# Patient Record
Sex: FEMALE | URBAN - METROPOLITAN AREA
[De-identification: names, ages, dates, MRNs, and addresses within clinical notes are randomized per-mention and may not be internally consistent; named-entity substitution may affect disease eponyms.]

---

## 2015-12-05 PROCEDURE — G8991 OTHER PT/OT GOAL STATUS: HCPCS

## 2018-05-18 ENCOUNTER — APPOINTMENT (OUTPATIENT)
Age: 48
Setting detail: DERMATOLOGY
End: 2018-06-11

## 2018-05-18 VITALS — HEIGHT: 64 IN | WEIGHT: 127 LBS

## 2018-05-18 DIAGNOSIS — L30.9 DERMATITIS, UNSPECIFIED: ICD-10-CM

## 2018-05-18 PROCEDURE — OTHER MEDICATION COUNSELING: OTHER

## 2018-05-18 PROCEDURE — OTHER PRESCRIPTION: OTHER

## 2018-05-18 PROCEDURE — OTHER ORDER TESTS: OTHER

## 2018-05-18 PROCEDURE — OTHER MIPS QUALITY: OTHER

## 2018-05-18 PROCEDURE — 11100: CPT

## 2018-05-18 PROCEDURE — OTHER BIOPSY BY PUNCH METHOD: OTHER

## 2018-05-18 RX ORDER — PREDNISONE 10 MG/1
TABLET ORAL
Qty: 20 | Refills: 0 | Status: ERX | COMMUNITY
Start: 2018-05-18

## 2018-05-18 ASSESSMENT — LOCATION SIMPLE DESCRIPTION DERM: LOCATION SIMPLE: LEFT UPPER BACK

## 2018-05-18 ASSESSMENT — LOCATION ZONE DERM: LOCATION ZONE: TRUNK

## 2018-05-18 ASSESSMENT — LOCATION DETAILED DESCRIPTION DERM: LOCATION DETAILED: LEFT SUPERIOR UPPER BACK

## 2018-05-18 NOTE — PROCEDURE: MEDICATION COUNSELING
Bactrim Pregnancy And Lactation Text: This medication is Pregnancy Category D and is known to cause fetal risk.  It is also excreted in breast milk.
Azathioprine Pregnancy And Lactation Text: This medication is Pregnancy Category D and isn't considered safe during pregnancy. It is unknown if this medication is excreted in breast milk.
Erythromycin Pregnancy And Lactation Text: This medication is Pregnancy Category B and is considered safe during pregnancy. It is also excreted in breast milk.
Humira Counseling:  I discussed with the patient the risks of adalimumab including but not limited to myelosuppression, immunosuppression, autoimmune hepatitis, demyelinating diseases, lymphoma, and serious infections.  The patient understands that monitoring is required including a PPD at baseline and must alert us or the primary physician if symptoms of infection or other concerning signs are noted.
Ketoconazole Counseling:   Patient counseled regarding improving absorption with orange juice.  Adverse effects include but are not limited to breast enlargement, headache, diarrhea, nausea, upset stomach, liver function test abnormalities, taste disturbance, and stomach pain.  There is a rare possibility of liver failure that can occur when taking ketoconazole. The patient understands that monitoring of LFTs may be required, especially at baseline. The patient verbalized understanding of the proper use and possible adverse effects of ketoconazole.  All of the patient's questions and concerns were addressed.
Eucrisa Counseling: Patient may experience a mild burning sensation during topical application. Eucrisa is not approved in children less than 2 years of age.
Odomzo Counseling- I discussed with the patient the risks of Odomzo including but not limited to nausea, vomiting, diarrhea, constipation, weight loss, changes in the sense of taste, decreased appetite, muscle spasms, and hair loss.  The patient verbalized understanding of the proper use and possible adverse effects of Odomzo.  All of the patient's questions and concerns were addressed.
Otezla Counseling: The side effects of Otezla were discussed with the patient, including but not limited to worsening or new depression, weight loss, diarrhea, nausea, upper respiratory tract infection, and headache. Patient instructed to call the office should any adverse effect occur.  The patient verbalized understanding of the proper use and possible adverse effects of Otezla.  All the patient's questions and concerns were addressed.
Benzoyl Peroxide Counseling: Patient counseled that medicine may cause skin irritation and bleach clothing.  In the event of skin irritation, the patient was advised to reduce the amount of the drug applied or use it less frequently.   The patient verbalized understanding of the proper use and possible adverse effects of benzoyl peroxide.  All of the patient's questions and concerns were addressed.
Use Enhanced Medication Counseling?: No
Solaraze Pregnancy And Lactation Text: This medication is Pregnancy Category B and is considered safe. There is some data to suggest avoiding during the third trimester. It is unknown if this medication is excreted in breast milk.
Bexarotene Pregnancy And Lactation Text: This medication is Pregnancy Category X and should not be given to women who are pregnant or may become pregnant. This medication should not be used if you are breast feeding.
Hydroxyzine Pregnancy And Lactation Text: This medication is not safe during pregnancy and should not be taken. It is also excreted in breast milk and breast feeding isn't recommended.
Cyclosporine Counseling:  I discussed with the patient the risks of cyclosporine including but not limited to hypertension, gingival hyperplasia,myelosuppression, immunosuppression, liver damage, kidney damage, neurotoxicity, lymphoma, and serious infections. The patient understands that monitoring is required including baseline blood pressure, CBC, CMP, lipid panel and uric acid, and then 1-2 times monthly CMP and blood pressure.
Valtrex Pregnancy And Lactation Text: this medication is Pregnancy Category B and is considered safe during pregnancy. This medication is not directly found in breast milk but it's metabolite acyclovir is present.
Infliximab Counseling:  I discussed with the patient the risks of infliximab including but not limited to myelosuppression, immunosuppression, autoimmune hepatitis, demyelinating diseases, lymphoma, and serious infections.  The patient understands that monitoring is required including a PPD at baseline and must alert us or the primary physician if symptoms of infection or other concerning signs are noted.
Dupixent Counseling: I discussed with the patient the risks of dupilumab including but not limited to eye infection and irritation, cold sores, injection site reactions, worsening of asthma, allergic reactions and increased risk of parasitic infection.  Live vaccines should be avoided while taking dupilumab. Dupilumab will also interact with certain medications such as warfarin and cyclosporine. The patient understands that monitoring is required and they must alert us or the primary physician if symptoms of infection or other concerning signs are noted.
Isotretinoin Pregnancy And Lactation Text: This medication is Pregnancy Category X and is considered extremely dangerous during pregnancy. It is unknown if it is excreted in breast milk.
Taltz Pregnancy And Lactation Text: The risk during pregnancy and breastfeeding is uncertain with this medication.
5-Fu Pregnancy And Lactation Text: This medication is Pregnancy Category X and contraindicated in pregnancy and in women who may become pregnant. It is unknown if this medication is excreted in breast milk.
Solaraze Counseling:  I discussed with the patient the risks of Solaraze including but not limited to erythema, scaling, itching, weeping, crusting, and pain.
Topical Retinoid counseling:  Patient advised to apply a pea-sized amount only at bedtime and wait 30 minutes after washing their face before applying.  If too drying, patient may add a non-comedogenic moisturizer. The patient verbalized understanding of the proper use and possible adverse effects of retinoids.  All of the patient's questions and concerns were addressed.
Rifampin Pregnancy And Lactation Text: This medication is Pregnancy Category C and it isn't know if it is safe during pregnancy. It is also excreted in breast milk and should not be used if you are breast feeding.
Minocycline Pregnancy And Lactation Text: This medication is Pregnancy Category D and not consider safe during pregnancy. It is also excreted in breast milk.
Oxybutynin Pregnancy And Lactation Text: This medication is Pregnancy Category B and is considered safe during pregnancy. It is unknown if it is excreted in breast milk.
Minocycline Counseling: Patient advised regarding possible photosensitivity and discoloration of the teeth, skin, lips, tongue and gums.  Patient instructed to avoid sunlight, if possible.  When exposed to sunlight, patients should wear protective clothing, sunglasses, and sunscreen.  The patient was instructed to call the office immediately if the following severe adverse effects occur:  hearing changes, easy bruising/bleeding, severe headache, or vision changes.  The patient verbalized understanding of the proper use and possible adverse effects of minocycline.  All of the patient's questions and concerns were addressed.
Itraconazole Pregnancy And Lactation Text: This medication is Pregnancy Category C and it isn't know if it is safe during pregnancy. It is also excreted in breast milk.
Cosentyx Counseling:  I discussed with the patient the risks of Cosentyx including but not limited to worsening of Crohn's disease, immunosuppression, allergic reactions and infections.  The patient understands that monitoring is required including a PPD at baseline and must alert us or the primary physician if symptoms of infection or other concerning signs are noted.
Nsaids Pregnancy And Lactation Text: These medications are considered safe up to 30 weeks gestation. It is excreted in breast milk.
Methotrexate Pregnancy And Lactation Text: This medication is Pregnancy Category X and is known to cause fetal harm. This medication is excreted in breast milk.
Otezla Pregnancy And Lactation Text: This medication is Pregnancy Category C and it isn't known if it is safe during pregnancy. It is unknown if it is excreted in breast milk.
Tetracycline Counseling: Patient counseled regarding possible photosensitivity and increased risk for sunburn.  Patient instructed to avoid sunlight, if possible.  When exposed to sunlight, patients should wear protective clothing, sunglasses, and sunscreen.  The patient was instructed to call the office immediately if the following severe adverse effects occur:  hearing changes, easy bruising/bleeding, severe headache, or vision changes.  The patient verbalized understanding of the proper use and possible adverse effects of tetracycline.  All of the patient's questions and concerns were addressed. Patient understands to avoid pregnancy while on therapy due to potential birth defects.
Protopic Pregnancy And Lactation Text: This medication is Pregnancy Category C. It is unknown if this medication is excreted in breast milk when applied topically.
Sski Pregnancy And Lactation Text: This medication is Pregnancy Category D and isn't considered safe during pregnancy. It is excreted in breast milk.
Stelara Counseling:  I discussed with the patient the risks of ustekinumab including but not limited to immunosuppression, malignancy, posterior leukoencephalopathy syndrome, and serious infections.  The patient understands that monitoring is required including a PPD at baseline and must alert us or the primary physician if symptoms of infection or other concerning signs are noted.
Prednisone Pregnancy And Lactation Text: This medication is Pregnancy Category C and it isn't know if it is safe during pregnancy. This medication is excreted in breast milk.
Minoxidil Counseling: Minoxidil is a topical medication which can increase blood flow where it is applied. It is uncertain how this medication increases hair growth. Side effects are uncommon and include stinging and allergic reactions.
Spironolactone Pregnancy And Lactation Text: This medication can cause feminization of the male fetus and should be avoided during pregnancy. The active metabolite is also found in breast milk.
Clofazimine Pregnancy And Lactation Text: This medication is Pregnancy Category C and isn't considered safe during pregnancy. It is excreted in breast milk.
Glycopyrrolate Pregnancy And Lactation Text: This medication is Pregnancy Category B and is considered safe during pregnancy. It is unknown if it is excreted breast milk.
Ketoconazole Pregnancy And Lactation Text: This medication is Pregnancy Category C and it isn't know if it is safe during pregnancy. It is also excreted in breast milk and breast feeding isn't recommended.
Topical Retinoid Pregnancy And Lactation Text: This medication is Pregnancy Category C. It is unknown if this medication is excreted in breast milk.
Doxycycline Counseling:  Patient counseled regarding possible photosensitivity and increased risk for sunburn.  Patient instructed to avoid sunlight, if possible.  When exposed to sunlight, patients should wear protective clothing, sunglasses, and sunscreen.  The patient was instructed to call the office immediately if the following severe adverse effects occur:  hearing changes, easy bruising/bleeding, severe headache, or vision changes.  The patient verbalized understanding of the proper use and possible adverse effects of doxycycline.  All of the patient's questions and concerns were addressed.
Carac Counseling:  I discussed with the patient the risks of Carac including but not limited to erythema, scaling, itching, weeping, crusting, and pain.
Enbrel Pregnancy And Lactation Text: This medication is Pregnancy Category B and is considered safe during pregnancy. It is unknown if this medication is excreted in breast milk.
Hydroquinone Counseling:  Patient advised that medication may result in skin irritation, lightening (hypopigmentation), dryness, and burning.  In the event of skin irritation, the patient was advised to reduce the amount of the drug applied or use it less frequently.  Rarely, spots that are treated with hydroquinone can become darker (pseudoochronosis).  Should this occur, patient instructed to stop medication and call the office. The patient verbalized understanding of the proper use and possible adverse effects of hydroquinone.  All of the patient's questions and concerns were addressed.
Thalidomide Pregnancy And Lactation Text: This medication is Pregnancy Category X and is absolutely contraindicated during pregnancy. It is unknown if it is excreted in breast milk.
Xelcolettez Pregnancy And Lactation Text: This medication is Pregnancy Category D and is not considered safe during pregnancy.  The risk during breast feeding is also uncertain.
High Dose Vitamin A Counseling: Side effects reviewed, pt to contact office should one occur.
Valtrex Counseling: I discussed with the patient the risks of valacyclovir including but not limited to kidney damage, nausea, vomiting and severe allergy.  The patient understands that if the infection seems to be worsening or is not improving, they are to call.
Topical Sulfur Applications Pregnancy And Lactation Text: This medication is Pregnancy Category C and has an unknown safety profile during pregnancy. It is unknown if this topical medication is excreted in breast milk.
Ivermectin Pregnancy And Lactation Text: This medication is Pregnancy Category C and it isn't known if it is safe during pregnancy. It is also excreted in breast milk.
High Dose Vitamin A Pregnancy And Lactation Text: High dose vitamin A therapy is contraindicated during pregnancy and breast feeding.
Acitretin Counseling:  I discussed with the patient the risks of acitretin including but not limited to hair loss, dry lips/skin/eyes, liver damage, hyperlipidemia, depression/suicidal ideation, photosensitivity.  Serious rare side effects can include but are not limited to pancreatitis, pseudotumor cerebri, bony changes, clot formation/stroke/heart attack.  Patient understands that alcohol is contraindicated since it can result in liver toxicity and significantly prolong the elimination of the drug by many years.
Cimetidine Counseling:  I discussed with the patient the risks of Cimetidine including but not limited to gynecomastia, headache, diarrhea, nausea, drowsiness, arrhythmias, pancreatitis, skin rashes, psychosis, bone marrow suppression and kidney toxicity.
Acitretin Pregnancy And Lactation Text: This medication is Pregnancy Category X and should not be given to women who are pregnant or may become pregnant in the future. This medication is excreted in breast milk.
Cephalexin Pregnancy And Lactation Text: This medication is Pregnancy Category B and considered safe during pregnancy.  It is also excreted in breast milk but can be used safely for shorter doses.
Dapsone Counseling: I discussed with the patient the risks of dapsone including but not limited to hemolytic anemia, agranulocytosis, rashes, methemoglobinemia, kidney failure, peripheral neuropathy, headaches, GI upset, and liver toxicity.  Patients who start dapsone require monitoring including baseline LFTs and weekly CBCs for the first month, then every month thereafter.  The patient verbalized understanding of the proper use and possible adverse effects of dapsone.  All of the patient's questions and concerns were addressed.
Prednisone Counseling:  I discussed with the patient the risks of prolonged use of prednisone including but not limited to weight gain, insomnia, osteoporosis, mood changes, diabetes, susceptibility to infection, glaucoma and high blood pressure.  In cases where prednisone use is prolonged, patients should be monitored with blood pressure checks, serum glucose levels and an eye exam.  Additionally, the patient may need to be placed on GI prophylaxis, PCP prophylaxis, and calcium and vitamin D supplementation and/or a bisphosphonate.  The patient verbalized understanding of the proper use and the possible adverse effects of prednisone.  All of the patient's questions and concerns were addressed.
Dupixent Pregnancy And Lactation Text: This medication likely crosses the placenta but the risk for the fetus is uncertain. This medication is excreted in breast milk.
Albendazole Counseling:  I discussed with the patient the risks of albendazole including but not limited to cytopenia, kidney damage, nausea/vomiting and severe allergy.  The patient understands that this medication is being used in an off-label manner.
Azithromycin Counseling:  I discussed with the patient the risks of azithromycin including but not limited to GI upset, allergic reaction, drug rash, diarrhea, and yeast infections.
Enbrel Counseling:  I discussed with the patient the risks of etanercept including but not limited to myelosuppression, immunosuppression, autoimmune hepatitis, demyelinating diseases, lymphoma, and infections.  The patient understands that monitoring is required including a PPD at baseline and must alert us or the primary physician if symptoms of infection or other concerning signs are noted.
Doxepin Counseling:  Patient advised that the medication is sedating and not to drive a car after taking this medication. Patient informed of potential adverse effects including but not limited to dry mouth, urinary retention, and blurry vision.  The patient verbalized understanding of the proper use and possible adverse effects of doxepin.  All of the patient's questions and concerns were addressed.
Hydroquinone Pregnancy And Lactation Text: This medication has not been assigned a Pregnancy Risk Category but animal studies failed to show danger with the topical medication. It is unknown if the medication is excreted in breast milk.
Oxybutynin Counseling:  I discussed with the patient the risks of oxybutynin including but not limited to skin rash, drowsiness, dry mouth, difficulty urinating, and blurred vision.
Picato Counseling:  I discussed with the patient the risks of Picato including but not limited to erythema, scaling, itching, weeping, crusting, and pain.
Cephalexin Counseling: I counseled the patient regarding use of cephalexin as an antibiotic for prophylactic and/or therapeutic purposes. Cephalexin (commonly prescribed under brand name Keflex) is a cephalosporin antibiotic which is active against numerous classes of bacteria, including most skin bacteria. Side effects may include nausea, diarrhea, gastrointestinal upset, rash, hives, yeast infections, and in rare cases, hepatitis, kidney disease, seizures, fever, confusion, neurologic symptoms, and others. Patients with severe allergies to penicillin medications are cautioned that there is about a 10% incidence of cross-reactivity with cephalosporins. When possible, patients with penicillin allergies should use alternatives to cephalosporins for antibiotic therapy.
Taltz Counseling: I discussed with the patient the risks of ixekizumab including but not limited to immunosuppression, serious infections, worsening of inflammatory bowel disease and drug reactions.  The patient understands that monitoring is required including a PPD at baseline and must alert us or the primary physician if symptoms of infection or other concerning signs are noted.
Rifampin Counseling: I discussed with the patient the risks of rifampin including but not limited to liver damage, kidney damage, red-orange body fluids, nausea/vomiting and severe allergy.
Cimetidine Pregnancy And Lactation Text: This medication is Pregnancy Category B and is considered safe during pregnancy. It is also excreted in breast milk and breast feeding isn't recommended.
Rituxan Counseling:  I discussed with the patient the risks of Rituxan infusions. Side effects can include infusion reactions, severe drug rashes including mucocutaneous reactions, reactivation of latent hepatitis and other infections and rarely progressive multifocal leukoencephalopathy.  All of the patient's questions and concerns were addressed.
Tazorac Pregnancy And Lactation Text: This medication is not safe during pregnancy. It is unknown if this medication is excreted in breast milk.
Doxepin Pregnancy And Lactation Text: This medication is Pregnancy Category C and it isn't known if it is safe during pregnancy. It is also excreted in breast milk and breast feeding isn't recommended.
Hydroxyzine Counseling: Patient advised that the medication is sedating and not to drive a car after taking this medication.  Patient informed of potential adverse effects including but not limited to dry mouth, urinary retention, and blurry vision.  The patient verbalized understanding of the proper use and possible adverse effects of hydroxyzine.  All of the patient's questions and concerns were addressed.
Terbinafine Counseling: Patient counseling regarding adverse effects of terbinafine including but not limited to headache, diarrhea, rash, upset stomach, liver function test abnormalities, itching, taste/smell disturbance, nausea, abdominal pain, and flatulence.  There is a rare possibility of liver failure that can occur when taking terbinafine.  The patient understands that a baseline LFT and kidney function test may be required. The patient verbalized understanding of the proper use and possible adverse effects of terbinafine.  All of the patient's questions and concerns were addressed.
Elidel Counseling: Patient may experience a mild burning sensation during topical application. Elidel is not approved in children less than 2 years of age. There have been case reports of hematologic and skin malignancies in patients using topical calcineurin inhibitors although causality is questionable.
5-Fu Counseling: 5-Fluorouracil Counseling:  I discussed with the patient the risks of 5-fluorouracil including but not limited to erythema, scaling, itching, weeping, crusting, and pain.
Quinolones Counseling:  I discussed with the patient the risks of fluoroquinolones including but not limited to GI upset, allergic reaction, drug rash, diarrhea, dizziness, photosensitivity, yeast infections, liver function test abnormalities, tendonitis/tendon rupture.
Birth Control Pills Pregnancy And Lactation Text: This medication should be avoided if pregnant and for the first 30 days post-partum.
Griseofulvin Pregnancy And Lactation Text: This medication is Pregnancy Category X and is known to cause serious birth defects. It is unknown if this medication is excreted in breast milk but breast feeding should be avoided.
Drysol Counseling:  I discussed with the patient the risks of drysol/aluminum chloride including but not limited to skin rash, itching, irritation, burning.
Benzoyl Peroxide Pregnancy And Lactation Text: This medication is Pregnancy Category C. It is unknown if benzoyl peroxide is excreted in breast milk.
Hydroxychloroquine Counseling:  I discussed with the patient that a baseline ophthalmologic exam is needed at the start of therapy and every year thereafter while on therapy. A CBC may also be warranted for monitoring.  The side effects of this medication were discussed with the patient, including but not limited to agranulocytosis, aplastic anemia, seizures, rashes, retinopathy, and liver toxicity. Patient instructed to call the office should any adverse effect occur.  The patient verbalized understanding of the proper use and possible adverse effects of Plaquenil.  All the patient's questions and concerns were addressed.
Fluconazole Counseling:  Patient counseled regarding adverse effects of fluconazole including but not limited to headache, diarrhea, nausea, upset stomach, liver function test abnormalities, taste disturbance, and stomach pain.  There is a rare possibility of liver failure that can occur when taking fluconazole.  The patient understands that monitoring of LFTs and kidney function test may be required, especially at baseline. The patient verbalized understanding of the proper use and possible adverse effects of fluconazole.  All of the patient's questions and concerns were addressed.
Hydroxychloroquine Pregnancy And Lactation Text: This medication has been shown to cause fetal harm but it isn't assigned a Pregnancy Risk Category. There are small amounts excreted in breast milk.
Tremfya Counseling: I discussed with the patient the risks of guselkumab including but not limited to immunosuppression, serious infections, worsening of inflammatory bowel disease and drug reactions.  The patient understands that monitoring is required including a PPD at baseline and must alert us or the primary physician if symptoms of infection or other concerning signs are noted.
Bactrim Counseling:  I discussed with the patient the risks of sulfa antibiotics including but not limited to GI upset, allergic reaction, drug rash, diarrhea, dizziness, photosensitivity, and yeast infections.  Rarely, more serious reactions can occur including but not limited to aplastic anemia, agranulocytosis, methemoglobinemia, blood dyscrasias, liver or kidney failure, lung infiltrates or desquamative/blistering drug rashes.
Griseofulvin Counseling:  I discussed with the patient the risks of griseofulvin including but not limited to photosensitivity, cytopenia, liver damage, nausea/vomiting and severe allergy.  The patient understands that this medication is best absorbed when taken with a fatty meal (e.g., ice cream or french fries).
Arava Counseling:  Patient counseled regarding adverse effects of Arava including but not limited to nausea, vomiting, abnormalities in liver function tests. Patients may develop mouth sores, rash, diarrhea, and abnormalities in blood counts. The patient understands that monitoring is required including LFTs and blood counts.  There is a rare possibility of scarring of the liver and lung problems that can occur when taking methotrexate. Persistent nausea, loss of appetite, pale stools, dark urine, cough, and shortness of breath should be reported immediately. Patient advised to discontinue Arava treatment and consult with a physician prior to attempting conception. The patient will have to undergo a treatment to eliminate Arava from the body prior to conception.
Gabapentin Counseling: I discussed with the patient the risks of gabapentin including but not limited to dizziness, somnolence, fatigue and ataxia.
Colchicine Counseling:  Patient counseled regarding adverse effects including but not limited to stomach upset (nausea, vomiting, stomach pain, or diarrhea).  Patient instructed to limit alcohol consumption while taking this medication.  Colchicine may reduce blood counts especially with prolonged use.  The patient understands that monitoring of kidney function and blood counts may be required, especially at baseline. The patient verbalized understanding of the proper use and possible adverse effects of colchicine.  All of the patient's questions and concerns were addressed.
Topical Clindamycin Counseling: Patient counseled that this medication may cause skin irritation or allergic reactions.  In the event of skin irritation, the patient was advised to reduce the amount of the drug applied or use it less frequently.   The patient verbalized understanding of the proper use and possible adverse effects of clindamycin.  All of the patient's questions and concerns were addressed.
Birth Control Pills Counseling: Birth Control Pill Counseling: I discussed with the patient the potential side effects of OCPs including but not limited to increased risk of stroke, heart attack, thrombophlebitis, deep venous thrombosis, hepatic adenomas, breast changes, GI upset, headaches, and depression.  The patient verbalized understanding of the proper use and possible adverse effects of OCPs. All of the patient's questions and concerns were addressed.
Erivedge Counseling- I discussed with the patient the risks of Erivedge including but not limited to nausea, vomiting, diarrhea, constipation, weight loss, changes in the sense of taste, decreased appetite, muscle spasms, and hair loss.  The patient verbalized understanding of the proper use and possible adverse effects of Erivedge.  All of the patient's questions and concerns were addressed.
Drysol Pregnancy And Lactation Text: This medication is considered safe during pregnancy and breast feeding.
Rituxan Pregnancy And Lactation Text: This medication is Pregnancy Category C and it isn't know if it is safe during pregnancy. It is unknown if this medication is excreted in breast milk but similar antibodies are known to be excreted.
Xeljanz Counseling: I discussed with the patient the risks of Xeljanz therapy including increased risk of infection, liver issues, headache, diarrhea, or cold symptoms. Live vaccines should be avoided. They were instructed to call if they have any problems.
Azathioprine Counseling:  I discussed with the patient the risks of azathioprine including but not limited to myelosuppression, immunosuppression, hepatotoxicity, lymphoma, and infections.  The patient understands that monitoring is required including baseline LFTs, Creatinine, possible TPMP genotyping and weekly CBCs for the first month and then every 2 weeks thereafter.  The patient verbalized understanding of the proper use and possible adverse effects of azathioprine.  All of the patient's questions and concerns were addressed.
Ivermectin Counseling:  Patient instructed to take medication on an empty stomach with a full glass of water.  Patient informed of potential adverse effects including but not limited to nausea, diarrhea, dizziness, itching, and swelling of the extremities or lymph nodes.  The patient verbalized understanding of the proper use and possible adverse effects of ivermectin.  All of the patient's questions and concerns were addressed.
Simponi Counseling:  I discussed with the patient the risks of golimumab including but not limited to myelosuppression, immunosuppression, autoimmune hepatitis, demyelinating diseases, lymphoma, and serious infections.  The patient understands that monitoring is required including a PPD at baseline and must alert us or the primary physician if symptoms of infection or other concerning signs are noted.
Xolair Counseling:  Patient informed of potential adverse effects including but not limited to fever, muscle aches, rash and allergic reactions.  The patient verbalized understanding of the proper use and possible adverse effects of Xolair.  All of the patient's questions and concerns were addressed.
Metronidazole Pregnancy And Lactation Text: This medication is Pregnancy Category B and considered safe during pregnancy.  It is also excreted in breast milk.
Dapsone Pregnancy And Lactation Text: This medication is Pregnancy Category C and is not considered safe during pregnancy or breast feeding.
Glycopyrrolate Counseling:  I discussed with the patient the risks of glycopyrrolate including but not limited to skin rash, drowsiness, dry mouth, difficulty urinating, and blurred vision.
Spironolactone Counseling: Patient advised regarding risks of diarrhea, abdominal pain, hyperkalemia, birth defects (for female patients), liver toxicity and renal toxicity. The patient may need blood work to monitor liver and kidney function and potassium levels while on therapy. The patient verbalized understanding of the proper use and possible adverse effects of spironolactone.  All of the patient's questions and concerns were addressed.
Xolair Pregnancy And Lactation Text: This medication is Pregnancy Category B and is considered safe during pregnancy. This medication is excreted in breast milk.
Isotretinoin Counseling: Patient should get monthly blood tests, not donate blood, not drive at night if vision affected, not share medication, and not undergo elective surgery for 6 months after tx completed. Side effects reviewed, pt to contact office should one occur.
Doxycycline Pregnancy And Lactation Text: This medication is Pregnancy Category D and not consider safe during pregnancy. It is also excreted in breast milk but is considered safe for shorter treatment courses.
Erythromycin Counseling:  I discussed with the patient the risks of erythromycin including but not limited to GI upset, allergic reaction, drug rash, diarrhea, increase in liver enzymes, and yeast infections.
Clofazimine Counseling:  I discussed with the patient the risks of clofazimine including but not limited to skin and eye pigmentation, liver damage, nausea/vomiting, gastrointestinal bleeding and allergy.
Imiquimod Counseling:  I discussed with the patient the risks of imiquimod including but not limited to erythema, scaling, itching, weeping, crusting, and pain.  Patient understands that the inflammatory response to imiquimod is variable from person to person and was educated regarded proper titration schedule.  If flu-like symptoms develop, patient knows to discontinue the medication and contact us.
Methotrexate Counseling:  Patient counseled regarding adverse effects of methotrexate including but not limited to nausea, vomiting, abnormalities in liver function tests. Patients may develop mouth sores, rash, diarrhea, and abnormalities in blood counts. The patient understands that monitoring is required including LFT's and blood counts.  There is a rare possibility of scarring of the liver and lung problems that can occur when taking methotrexate. Persistent nausea, loss of appetite, pale stools, dark urine, cough, and shortness of breath should be reported immediately. Patient advised to discontinue methotrexate treatment at least three months before attempting to become pregnant.  I discussed the need for folate supplements while taking methotrexate.  These supplements can decrease side effects during methotrexate treatment. The patient verbalized understanding of the proper use and possible adverse effects of methotrexate.  All of the patient's questions and concerns were addressed.
Itraconazole Counseling:  I discussed with the patient the risks of itraconazole including but not limited to liver damage, nausea/vomiting, neuropathy, and severe allergy.  The patient understands that this medication is best absorbed when taken with acidic beverages such as non-diet cola or ginger ale.  The patient understands that monitoring is required including baseline LFTs and repeat LFTs at intervals.  The patient understands that they are to contact us or the primary physician if concerning signs are noted.
SSKI Counseling:  I discussed with the patient the risks of SSKI including but not limited to thyroid abnormalities, metallic taste, GI upset, fever, headache, acne, arthralgias, paraesthesias, lymphadenopathy, easy bleeding, arrhythmias, and allergic reaction.
Metronidazole Counseling:  I discussed with the patient the risks of metronidazole including but not limited to seizures, nausea/vomiting, a metallic taste in the mouth, nausea/vomiting and severe allergy.
Bexarotene Counseling:  I discussed with the patient the risks of bexarotene including but not limited to hair loss, dry lips/skin/eyes, liver abnormalities, hyperlipidemia, pancreatitis, depression/suicidal ideation, photosensitivity, drug rash/allergic reactions, hypothyroidism, anemia, leukopenia, infection, cataracts, and teratogenicity.  Patient understands that they will need regular blood tests to check lipid profile, liver function tests, white blood cell count, thyroid function tests and pregnancy test if applicable.
Cellcept Counseling:  I discussed with the patient the risks of mycophenolate mofetil including but not limited to infection/immunosuppression, GI upset, hypokalemia, hypercholesterolemia, bone marrow suppression, lymphoproliferative disorders, malignancy, GI ulceration/bleed/perforation, colitis, interstitial lung disease, kidney failure, progressive multifocal leukoencephalopathy, and birth defects.  The patient understands that monitoring is required including a baseline creatinine and regular CBC testing. In addition, patient must alert us immediately if symptoms of infection or other concerning signs are noted.
Azithromycin Pregnancy And Lactation Text: This medication is considered safe during pregnancy and is also secreted in breast milk.
Tazorac Counseling:  Patient advised that medication is irritating and drying.  Patient may need to apply sparingly and wash off after an hour before eventually leaving it on overnight.  The patient verbalized understanding of the proper use and possible adverse effects of tazorac.  All of the patient's questions and concerns were addressed.
Nsaids Counseling: NSAID Counseling: I discussed with the patient that NSAIDs should be taken with food. Prolonged use of NSAIDs can result in the development of stomach ulcers.  Patient advised to stop taking NSAIDs if abdominal pain occurs.  The patient verbalized understanding of the proper use and possible adverse effects of NSAIDs.  All of the patient's questions and concerns were addressed.
Protopic Counseling: Patient may experience a mild burning sensation during topical application. Protopic is not approved in children less than 2 years of age. There have been case reports of hematologic and skin malignancies in patients using topical calcineurin inhibitors although causality is questionable.
Thalidomide Counseling: I discussed with the patient the risks of thalidomide including but not limited to birth defects, anxiety, weakness, chest pain, dizziness, cough and severe allergy.
Topical Sulfur Applications Counseling: Topical Sulfur Counseling: Patient counseled that this medication may cause skin irritation or allergic reactions.  In the event of skin irritation, the patient was advised to reduce the amount of the drug applied or use it less frequently.   The patient verbalized understanding of the proper use and possible adverse effects of topical sulfur application.  All of the patient's questions and concerns were addressed.
Zyclara Counseling:  I discussed with the patient the risks of imiquimod including but not limited to erythema, scaling, itching, weeping, crusting, and pain.  Patient understands that the inflammatory response to imiquimod is variable from person to person and was educated regarded proper titration schedule.  If flu-like symptoms develop, patient knows to discontinue the medication and contact us.
Detail Level: Simple

## 2018-05-18 NOTE — PROCEDURE: BIOPSY BY PUNCH METHOD
Dressing: bandage
Home Suture Removal Text: Patient was provided a home suture removal kit and will remove their sutures at home.  If they have any questions or difficulties they will call the office.
Size Of Lesion In Cm (Optional): 0
Anesthesia Volume In Cc (Will Not Render If 0): 0.5
Consent: Written consent was obtained and risks were reviewed including but not limited to scarring, infection, bleeding, scabbing, incomplete removal, nerve damage and allergy to anesthesia.
Detail Level: Detailed
Bill [id number] For Specimen Handling/Conveyance To Laboratory?: no
Biopsy Type: H and E
Epidermal Sutures: 4-0 Prolene
Suture Removal: 7 days
Hemostasis: Drysol
Billing Type: Third-Party Bill
Notification Instructions: Patient will be notified of biopsy results. However, patient instructed to call the office if not contacted within 2 weeks.
Punch Size In Mm: 4
Post-Care Instructions: I reviewed with the patient in detail post-care instructions. Patient is to keep the biopsy site dry overnight, and then apply Vaseline twice daily until healed.
Anesthesia Type: 1% lidocaine with epinephrine
Was A Bandage Applied: Yes
Wound Care: Vaseline

## 2018-06-04 ENCOUNTER — APPOINTMENT (OUTPATIENT)
Age: 48
Setting detail: DERMATOLOGY
End: 2018-06-26

## 2018-06-04 VITALS — RESPIRATION RATE: 18 BRPM | WEIGHT: 127 LBS | HEIGHT: 64 IN

## 2018-06-04 DIAGNOSIS — L30.9 DERMATITIS, UNSPECIFIED: ICD-10-CM

## 2018-06-04 PROCEDURE — OTHER ORDER TESTS: OTHER

## 2018-06-04 PROCEDURE — OTHER TREATMENT REGIMEN: OTHER

## 2018-06-04 PROCEDURE — 99213 OFFICE O/P EST LOW 20 MIN: CPT

## 2018-06-04 PROCEDURE — OTHER MIPS QUALITY: OTHER

## 2018-06-04 PROCEDURE — OTHER COUNSELING: OTHER

## 2018-06-04 NOTE — PROCEDURE: MIPS QUALITY
Quality 226: Preventive Care And Screening: Tobacco Use: Screening And Cessation Intervention: Patient screened for tobacco and never smoked
Quality 128: Preventive Care And Screening: Body Mass Index (Bmi) Screening And Follow-Up Plan: BMI is documented within normal parameters and no follow-up plan is required.
Detail Level: Detailed

## 2018-06-04 NOTE — PROCEDURE: TREATMENT REGIMEN
Detail Level: Zone
Continue Regimen: Singulair, currently at home, PO QPM
Plan: Patient has been given a lab requisition order for blood work to be completed as soon as possible to rule out underlying cause of rash
Initiate Treatment: Zyrtec allergy, 10 mg PO QAM

## 2018-11-21 ENCOUNTER — TRANSCRIBE ORDERS (OUTPATIENT)
Dept: PHYSICAL THERAPY | Facility: HOSPITAL | Age: 48
End: 2018-11-21

## 2018-11-21 DIAGNOSIS — N94.2 VAGINISMUS: Primary | ICD-10-CM

## 2018-11-28 ENCOUNTER — APPOINTMENT (OUTPATIENT)
Dept: PHYSICAL THERAPY | Facility: HOSPITAL | Age: 48
End: 2018-11-28

## 2018-12-05 ENCOUNTER — HOSPITAL ENCOUNTER (OUTPATIENT)
Dept: PHYSICAL THERAPY | Facility: HOSPITAL | Age: 48
Setting detail: THERAPIES SERIES
Discharge: HOME | End: 2018-12-05

## 2018-12-05 DIAGNOSIS — R10.2 PELVIC PAIN IN FEMALE: Primary | ICD-10-CM

## 2018-12-05 DIAGNOSIS — N94.2 VAGINISMUS: ICD-10-CM

## 2018-12-05 PROCEDURE — G8991 OTHER PT/OT GOAL STATUS: HCPCS

## 2018-12-05 PROCEDURE — G8990 OTHER PT/OT CURRENT STATUS: HCPCS

## 2018-12-05 PROCEDURE — 97162 PT EVAL MOD COMPLEX 30 MIN: CPT | Performed by: PHYSICAL THERAPIST

## 2018-12-05 PROCEDURE — 97110 THERAPEUTIC EXERCISES: CPT | Performed by: PHYSICAL THERAPIST

## 2018-12-05 NOTE — THERAPY EVALUATION
Outpatient Physical Therapy Women's Health Initial Evaluation  [facility]     Patient Name: Saundra Christiansen  : 1970  MRN: [id number]  Today's Date: 2018        Visit Date: 2018  Visit number:   Recheck: 18  Insurance: Medicare     There is no problem list on file for this patient.       Past Medical History:   Diagnosis Date   • Abdominal pain    • Arthritis    • Asthma    • Back pain    • Depression    • GERD (gastroesophageal reflux disease)    • Pelvic pain    • PTSD (post-traumatic stress disorder)    • Stroke (CMS/HCC)         Past Surgical History:   Procedure Laterality Date   • APPENDECTOMY     • BACK SURGERY      L4/5 and L5/S1   • CHOLECYSTECTOMY     • HYSTERECTOMY     • SINUS SURGERY     • TUBAL ABDOMINAL LIGATION           Visit Dx:    ICD-10-CM ICD-9-CM   1. Pelvic pain in female R10.2 625.9   2. Vaginismus N94.2 625.1           Women's Health     Row Name 18 0800             Subjective Comments    Subjective Comments  Pelvic pain primary history of problem.  Endometriosis ongoing since 27 with severe bleeding.  LEEP procedure at stage 3 with secondary LEEP at age 29.  Age 33 hurt back with resultant fusion.  Used bone graft from pelvis on L side.  Pain has been ongoing since the time of approx age 34.  Always felt inflammed.  HPV history with ongoing problem and noted with abnormal paps.  Has had some cervical scrapping, but seems to continue to be a problem.  It has never been right. Ovarian cysts also a problem. Then resulted in hysterectomy at age 35.  Unable to take estrogen.  Had a history of pelvic pain with rough periods as a teen.  Contacted STD at age of 19.  Seems to have just been an ongoing problem of issues within her pelvis.  Can hurt with sitting position. Has both vaginal and pelvic bone pain. Always hurt after sex with intermittent bleeding.  Passes things like fecal stones and creates pain with passing of stones.  Sometimes has pressure on  bladder with attempts to urinate.  Leaks urine wearing pad daily.  Occasional leak at night.  Significant muscle spasms to PF.  Currently not working, but on disability for back.   -SW         Pregnancy Questions    Number of Pregnancies  2  -SW      Number of Children  1  -SW      Type of Previous Deliveries  Vaginal  -SW         Subjective Pain    Able to rate subjective pain?  yes  -SW      Pre-Treatment Pain Level  10  -SW      Subjective Pain Comment  pelvic and LB  -SW         Pelvic Floor Muscle    Strength (Right)  3: Squeeze with/without lift  -SW      Strength (Left)  3: Squeeze with/without lift  -SW      Symmetry of Sustained Maximal Contraction  Symmetrical  -SW      Endurance (Ability to Hold Maximal Contraction)  10 sec inability to return to baselines  -SW      # of Reps of Maximal Contractions while Maintaining Endurance and Strength  2 sets  -SW      Fast Contraction (# of 1 sec contractions performed)  6  -SW      Interior Muscle Comments  tenderness along post wall of vaginal opening.  Superficial transverse perineal tender Bilaterally.  Obt internus + bilaterally. Abdominally noted with triggers throughout abdominal wall.  Scar decreased mobility and fascial glide.  Mild external body tenderness noted along ischial tuberosity bilaterally and along inferior pubic ramus L>R.  -SW         Perineal Observation    Perineal Observation Performed?  Yes  -SW         Observation of Contraction in Perineum    Anal [place]  Present  -SW      Perineal Body Lift  Present  -SW         Pelvic Floor    Ability to Isolate Contraction of Pelvic Floor  Yes  -SW         Prolapse (Pop-Q)    Cystocele  Stage II  -SW      Urethrocele  Stage II prominent position but no change with valsalva  -SW         SEMG Evaluation    Pelvic Floor Electrode  Internal Vaginal  -SW      SEMG Evaluation Comments  Initial baselines at normal.  With attempts made of QF contractions to PF, tone began to elevate with inability to return to  baselines.  Fair amplitudes of contractions to PF.  Endurance fair x 10 sec but inability to relax following contraction.  Incoordination noted with PF movements and function.   -         Education Provided On:    Education Points  HEP;Behavioral modifications;Other (comment)  -        User Key  (r) = Recorded By, (t) = Taken By, (c) = Cosigned By    Initials Name Provider Type    Aixa Vickers, PT Physical Therapist        PT Ortho     Row Name 12/05/18 0800       Posture/Observations    Posture- WNL  Posture is WNL  -SW    Posture/Observations Comments  patient noted with upright normal posture in seated and standing positions.  Unremarkable gait.  No evidence of toe drag observed.   -       MMT (Manual Muscle Testing)    General MMT Comments  grossly functional to perform all transfers without restrictions.   -       Sensation    Sensation WNL?  WFL  -       Balance Skills Training    Balance Comments  normal static and dynamic seated balance.   -       Transfers    Comment (Transfers)  normal t/f without assist required.  -       Gait/Stairs Assessment/Training    Comment (Gait/Stairs)  normal gait without deviations at least on level surfaces.  No AD and no toe drag.   -      User Key  (r) = Recorded By, (t) = Taken By, (c) = Cosigned By    Initials Name Provider Type    Aixa Vickers, PT Physical Therapist                     PT Assessment/Plan     Row Name 12/05/18 0800          PT Assessment    Functional Limitations  Performance in self-care ADL;Performance in leisure activities;Other (comment) LAMIN  -SW     Impairments  Impaired muscle endurance;Impaired muscle power;Pain;Muscle strength;Poor body mechanics;Other (comment) UI   -SW     Assessment Comments  Patient is a 46 yo female presenting to clinic with complaints of pelvic pain.  She has a very complex medical history of vaginal problems, in addition to prior problems of endometriosis with resultant hysterectomy.  She  experiences pelvic pain as well as abdominal pain.  LAMIN also experienced recently as well.  Patient would benefit from skilled therapy intervention to address functional deficits and improve function to PF.  -     Rehab Potential  Good  -SW     Patient/caregiver participated in establishment of treatment plan and goals  Yes  -SW     Patient would benefit from skilled therapy intervention  Yes  -SW        PT Plan    PT Frequency  1x/week  -SW     Predicted Duration of Therapy Intervention (Therapy Eval)  8-12 visits  -     PT Plan Comments  behavioral management, downtraining followed by uptraining as able. Abdominal release for provided relaxation, deep breathing and coordination of PF use. Stimulation as necessary for pain relief or muscle relaxation post treatment.  Manual skilled therapy for tissue release as needed.  -       User Key  (r) = Recorded By, (t) = Taken By, (c) = Cosigned By    Initials Name Provider Type    Aixa Vickers, PT Physical Therapist            Exercises     Row Name 12/05/18 0800             Subjective Comments    Subjective Comments  Pelvic pain primary history of problem.  Endometriosis ongoing since 27 with severe bleeding.  LEEP procedure at stage 3 with secondary LEEP at age 29.  Age 33 hurt back with resultant fusion.  Used bone graft from pelvis on L side.  Pain has been ongoing since the time of approx age 34.  Always felt inflammed.  HPV history with ongoing problem and noted with abnormal paps.  Has had some cervical scrapping, but seems to continue to be a problem.  It has never been right. Ovarian cysts also a problem. Then resulted in hysterectomy at age 35.  Unable to take estrogen.  Had a history of pelvic pain with rough periods as a teen.  Contacted STD at age of 19.  Seems to have just been an ongoing problem of issues within her pelvis.  Can hurt with sitting position. Has both vaginal and pelvic bone pain. Always hurt after sex with intermittent  bleeding.  Passes things like fecal stones and creates pain with passing of stones.  Sometimes has pressure on bladder with attempts to urinate.  Leaks urine wearing pad daily.  Occasional leak at night.  Significant muscle spasms to PF.  Currently not working, but on disability for back.   -         Subjective Pain    Able to rate subjective pain?  yes  -      Pre-Treatment Pain Level  10  -      Subjective Pain Comment  pelvic and LB  -         Exercise 1    Exercise Name 1  deep breathing  -      Additional Comments  diaphraghmatic breathing with lateral rib cag expansion.   -         Exercise 2    Exercise Name 2  diaphragmatic breathing with PF coordination  -      Additional Comments  inhale, release and exhale, tighten/contract  -        User Key  (r) = Recorded By, (t) = Taken By, (c) = Cosigned By    Initials Name Provider Type    Aixa Vickers, PT Physical Therapist                      PT OP Goals     Row Name 12/05/18 0800          PT Short Term Goals    STG Date to Achieve  01/04/19  -     STG 1  patient to demonstrate abd release and comply daily with application.  -     STG 1 Progress  New  -     STG 2  patient to show increase PF activation without overflow of TA with PF recruitment.   -     STG 2 Progress  New  -     STG 3  patient to perform diaphragmatic breathing with correct coordination of PF excursion to appropriately support relaxation and normalized PF functions.  -     STG 3 Progress  New  -     STG 4  patient to perform toileting positions to promote improved defecation and urination without pain and improve emptying that may improve LAMIN.  -     STG 4 Progress  New  [facility]     STG 5  patient to show normal resting tone to PF in supine position consistently x 3 minutes without erratic behavior noted.   -     STG 5 Progress  New  [facility]        Long Term Goals    LTG Date to Achieve  03/29/19  -     LTG 1  subjectively improve pelvic pain by 70%   -      LTG 1 Progress  New  -     LTG 2  patient to demo no abdominal triggers and improved fascial glide without pain   -     LTG 2 Progress  New  -SW     LTG 3  patient to demonstrate normal coordination of SEMG in supine/seated postures such that contractions are normalized, resting tone rapidly achieved pre/post contraction, and with good amplitudes of movement noted.   -     LTG 3 Progress  New  -     LTG 4  patient to report pain as mild vs mod/severe and intermittent in nature vs constant.   -     LTG 4 Progress  New  -        Time Calculation    PT Goal Re-Cert Due Date  01/04/19  -       User Key  (r) = Recorded By, (t) = Taken By, (c) = Cosigned By    Initials Name Provider Type    Aixa Vickers, PT Physical Therapist          Therapy Education  Given: HEP  Program: New  How Provided: Verbal, Demonstration  Provided to: Patient  Level of Understanding: Teach back education performed, Verbalized, Demonstrated               Time Calculation:   Start Time: 0815  Stop Time: 0923  Time Calculation (min): 68 min  Therapy Suggested Charges     Code   Minutes Charges    None           Therapy Charges for Today     Code Description Service Date Service Provider Modifiers Qty    33724317737 HC PT THER PROC EA 15 MIN 12/5/2018 Aixa Villegas, PT GP 1    77775480113 HC PT EVAL MOD COMPLEXITY 4 12/5/2018 Aixa Villegas, PT GP 1                  Aixa Villegas PT  12/5/2018

## 2018-12-12 ENCOUNTER — APPOINTMENT (OUTPATIENT)
Dept: PHYSICAL THERAPY | Facility: HOSPITAL | Age: 48
End: 2018-12-12

## 2019-01-02 ENCOUNTER — APPOINTMENT (OUTPATIENT)
Dept: PHYSICAL THERAPY | Facility: HOSPITAL | Age: 49
End: 2019-01-02

## 2019-01-04 ENCOUNTER — DOCUMENTATION (OUTPATIENT)
Dept: PHYSICAL THERAPY | Facility: HOSPITAL | Age: 49
End: 2019-01-04

## 2019-01-04 DIAGNOSIS — R10.2 PELVIC PAIN IN FEMALE: Primary | ICD-10-CM

## 2019-01-04 DIAGNOSIS — N94.2 VAGINISMUS: ICD-10-CM

## 2019-02-27 NOTE — THERAPY DISCHARGE NOTE
Outpatient Physical Therapy Discharge Summary         Patient Name: Saundra Christiansen  : 1970  MRN: [id number]    Today's Date: 2019  DC date: 19  Attendance: 1/4 visits    Visit Dx:    ICD-10-CM ICD-9-CM   1. Pelvic pain in female R10.2 625.9   2. Vaginismus N94.2 625.1           OP PT Discharge Summary  Date of Discharge: 19  Reason for Discharge: other (comment)(Patient attended 1/4 scheduled visits.  She has not been seens since eval 18.  Due to lapse of 30 days treatment, formal DC completed.)  Outcomes Achieved: Refer to plan of care for updates on goals achieved  Discharge Destination: Home with home program  Discharge Instructions/Additional Comments: HEP initialized but unable to advance due to non-compliance of patient with PT.       Time Calculation: 2184-8379                 Aixa Villegas, PT  2019

## 2019-04-24 ENCOUNTER — TRANSCRIBE ORDERS (OUTPATIENT)
Dept: PHYSICAL THERAPY | Facility: HOSPITAL | Age: 49
End: 2019-04-24

## 2019-04-24 DIAGNOSIS — N94.2 VAGINISMUS: Primary | ICD-10-CM

## 2019-05-22 ENCOUNTER — APPOINTMENT (OUTPATIENT)
Dept: PHYSICAL THERAPY | Facility: HOSPITAL | Age: 49
End: 2019-05-22

## 2019-06-19 ENCOUNTER — APPOINTMENT (OUTPATIENT)
Dept: PHYSICAL THERAPY | Facility: HOSPITAL | Age: 49
End: 2019-06-19

## 2019-06-26 ENCOUNTER — APPOINTMENT (OUTPATIENT)
Dept: PHYSICAL THERAPY | Facility: HOSPITAL | Age: 49
End: 2019-06-26

## 2019-07-03 ENCOUNTER — HOSPITAL ENCOUNTER (OUTPATIENT)
Dept: PHYSICAL THERAPY | Facility: HOSPITAL | Age: 49
Setting detail: THERAPIES SERIES
Discharge: HOME | End: 2019-07-03

## 2019-07-03 DIAGNOSIS — R10.2 PELVIC PAIN IN FEMALE: Primary | ICD-10-CM

## 2019-07-03 DIAGNOSIS — N94.2 VAGINISMUS: ICD-10-CM

## 2019-07-03 PROCEDURE — 97162 PT EVAL MOD COMPLEX 30 MIN: CPT | Performed by: PHYSICAL THERAPIST

## 2019-07-03 NOTE — THERAPY EVALUATION
Outpatient Physical Therapy Pelvic Health Initial Evaluation  [facility]     Patient Name: Saundra Christiansen  : 1970  MRN: [id number]  Today's Date: 7/3/2019        Visit Date: 2019  Visit Number:   Insurance: Medicare  Recheck: 19    There is no problem list on file for this patient.       Past Medical History:   Diagnosis Date   • Abdominal pain    • Arthritis    • Asthma    • Back pain    • Bleeding ulcer 2018    stomach   • Depression    • GERD (gastroesophageal reflux disease)    • Hypertension    • Pelvic pain    • PTSD (post-traumatic stress disorder)    • Stroke (CMS/HCC)         Past Surgical History:   Procedure Laterality Date   • APPENDECTOMY     • BACK SURGERY      L4/5 and L5/S1   • CHOLECYSTECTOMY     • HYSTERECTOMY     • SINUS SURGERY     • TUBAL ABDOMINAL LIGATION           Visit Dx:    ICD-10-CM ICD-9-CM   1. Pelvic pain in female R10.2 625.9   2. Vaginismus N94.2 625.1           Pelvic Health     Row Name 19 1300             Subjective Comments    Subjective Comments  Long standing history of HPV and pelvic pain.  Also has endometriosis history as well.  Onset of at least 20 yo.  Eased up some but returns on/off.  Have had a LEEP x 2 prior to hysterctomy due to heavy bleeding.  No real assist to pelvic pain. Abnormal pap smear history.  Pelvic pain all the time, never with a give or take noted.  Pain with sitting. Denies pain with wiping. Feels she needs to pee a lot, avg every 1 hour.  Nocturia 1-2 per night.  Has an odor with urination.  Urine is cloudy in the morning.  Urinalysis completed with elevated WBC detected. Pressure with attempts to urinate and admits to pain with defecation.  Has tried to drink prune juice to assist with a little assist.  Tons of pressure.  Take Linzess but doesn't take consistently due to making  her go too often. Bowel frequency every other day. [place] stool scale of 1.  Water intake and Genaro Dry on ocasion.  Some blueberry  tea on ocassion. Pain noted with penetration primarily, sometimes with improvements post penetration.  Marinoff scale: 1-2 average. Currently without partner, but when was able to have intecourse would rate it that number.  Feeling of dryness noted.  Lubricant used Replenish OTC.  Leaks some urine and some urgency reported.  Able to tolerate clitoral head contact without inc pain. Has a history of back surgery L4-S2 with spinal fusion.  Reports having pain prior to this surgery.  Disabled due to this reason. No appt with MD currently with referral. Patient admits that she initiated PT in December but had other medical conditions that came up that required addressing prior to restart of her PT (HTN and bleeding stomach ulcer).  She is now with desire to comply with program to ensure improvements of her s/s.  -SW         Pregnancy Questions    Number of Pregnancies  2  -SW      Number of Children  1  -SW      Type of Previous Deliveries  Vaginal  -SW         Pain Assessment    Pain Assessment  0-10  -SW      Pain Score  7  -SW      Post Pain Score  7  -SW         Pelvic Floor Muscle    Patient/Parent/Guardian Consented to Internal Pelvic Floor Exam  Yes  -SW      Strength (Right)  3: Squeeze with/without lift  -SW      Strength (Left)  3: Squeeze with/without lift  -SW      Symmetry of Sustained Maximal Contraction  Symmetrical  -SW      Endurance (Ability to Hold Maximal Contraction)  7 sec  -SW      # of Reps of Maximal Contractions while Maintaining Endurance and Strength  2 sets  -SW      Fast Contraction (# of 1 sec contractions performed)  8  -SW      Internal Pelvic Floor Comments  Palpated drop to bladder in supine position. Stable without advancement with valsalva. Tenderness noted along super transverse perineal group. Deep palpational layer of levator ani group tenderness with primary focus along pubococcygeus mucles.   -SW      External Pelvic Floor Comments  tenderness noted along PS and inf angle of  pubic rim bilaterally.  Ob internus external palpation bilaterally ttp.  Perineal body with dec mobility sup/inf direction. Location is normal to slightly elevated position. Abdominally triggers noted throughout.  Dec fascial motility noted.  Midline to L side abdominal scar from LB surgery with thickness noted and dec motility of fascia.   -SW         Observations    Perineal Observation Performed?  Yes  -SW         Observation of Contraction in Perineum    Anal [place]  Present  -SW      Perineal Body Lift  Present  -SW         Prolapse (Pop-Q)    Cystocele  Stage II  -SW         SEMG Evaluation    SEMG Evaluation Comments  deferred this visit.  -SW         Education Provided On:    Education Points  HEP;Other (comment) bladder diary; abdominal massage  -SW        User Key  (r) = Recorded By, (t) = Taken By, (c) = Cosigned By    Initials Name Provider Type    Aixa Vickers, PT Physical Therapist                       PT Assessment/Plan     Row Name 07/03/19 1300          PT Assessment    Functional Limitations  Performance in self-care ADL;Performance in leisure activities;Limitations in community activities;Other (comment) UI  -SW     Impairments  Impaired muscle endurance;Impaired muscle length;Impaired muscle power;Muscle strength;Pain;Other (comment) UI  -SW     Assessment Comments  Patient is a 49 yo female presenting to clinic with complaints of pelvic pain.  She has a very complex medical history of vaginal problems, in addition to prior history of endometriosis with resultant hysteretomy.  She experiences pain in the pelvic region as well as abdominally.  She also experiences  urgency and UI as well. She is also s/p lumbar fusion which may contribute to some weakness in general core which contributes to overall pain. Patient would benefit from skilled therapy intervention to address functional deficits and improve overall function to PF.   -SW     Rehab Potential  Good  -SW     Patient/caregiver  participated in establishment of treatment plan and goals  Yes  -SW     Patient would benefit from skilled therapy intervention  Yes  -SW        PT Plan    PT Frequency  1x/week  -SCOTT     Predicted Duration of Therapy Intervention (Therapy Eval)  8-12 visits  -     PT Plan Comments  behavioral management, downtraining followed by uptraining; abdominal release to provide inc relaxation, coordination in PF use. Stimulation as necessary for pain relief or muscle relaxation post treatment. Manual skilled therapy for tissue release as needed.   -       User Key  (r) = Recorded By, (t) = Taken By, (c) = Cosigned By    Initials Name Provider Type    Aixa Vickers, PT Physical Therapist            Exercises     Row Name 07/03/19 1300             Subjective Comments    Subjective Comments  Long standing history of HPV and pelvic pain.  Also has endometriosis history as well.  Onset of at least 22 yo.  Eased up some but returns on/off.  Have had a LEEP x 2 prior to hysterctomy due to heavy bleeding.  No real assist to pelvic pain. Abnormal pap smear history.  Pelvic pain all the time, never with a give or take noted.  Pain with sitting. Denies pain with wiping. Feels she needs to pee a lot, avg every 1 hour.  Nocturia 1-2 per night.  Has an odor with urination.  Urine is cloudy in the morning.  Urinalysis completed with elevated WBC detected. Pressure with attempts to urinate and admits to pain with defecation.  Has tried to drink prune juice to assist with a little assist.  Tons of pressure.  Take Linzess but doesn't take consistently due to making  her go too often. Bowel frequency every other day. [place] stool scale of 1.  Water intake and Genaro Dry on ocasion.  Some blueberry tea on ocassion. Pain noted with penetration primarily, sometimes with improvements post penetration.  Marinoff scale: 1-2 average. Currently without partner, but when was able to have intecourse would rate it that number.  Feeling of  dryness noted.  Lubricant used Replenish OTC.  Leaks some urine and some urgency reported.  Able to tolerate clitoral head contact without inc pain. Has a history of back surgery L4-S2 with spinal fusion.  Reports having pain prior to this surgery.  Disabled due to this reason. No appt with MD currently with referral. Patient admits that she initiated PT in December but had other medical conditions that came up that required addressing prior to restart of her PT (HTN and bleeding stomach ulcer).  She is now with desire to comply with program to ensure improvements of her s/s.  -         Exercise 1    Exercise Name 1  bladder diary   -      Additional Comments  instruction given for 3 day tracking  -         Exercise 2    Exercise Name 2  abdominal massage initation.   -        User Key  (r) = Recorded By, (t) = Taken By, (c) = Cosigned By    Initials Name Provider Type    Aixa Vickers, PT Physical Therapist                      PT OP Goals     Row Name 07/03/19 1600          PT Short Term Goals    STG Date to Achieve  07/31/19  -     STG 1  patient to demonstrate abd release and comply daily with application.  -     STG 1 Progress  New  -     STG 2  patient to show increase PF activation without overflow of TA with PF recruitment.   -     STG 2 Progress  New  -     STG 3  patient to perform diaphragmatic breathing with correct coordination of PF excursion to appropriately support relaxation and normalized PF functions.  -     STG 3 Progress  New  -     STG 4  patient to perform toileting positions to promote improved defecation and urination without pain and improve emptying that may improve LAMIN.  -     STG 4 Progress  New  -     STG 5  patient to show normal resting tone to PF in supine position consistently x 3 minutes without erratic behavior noted.   -     STG 5 Progress  New  [facility]        Long Term Goals    LTG Date to Achieve  11/27/19  -     LTG 1  subjectively improve  pelvic pain by 70%   -SW     LTG 1 Progress  New  -     LTG 2  patient to demo no abdominal triggers and improved fascial glide without pain   -SW     LTG 2 Progress  New  -SW     LTG 3  patient to demonstrate normal coordination of SEMG in supine/seated postures such that contractions are normalized, resting tone rapidly achieved pre/post contraction, and with good amplitudes of movement noted.   -     LTG 3 Progress  New  -     LTG 4  patient to report pain as mild vs mod/severe and intermittent in nature vs constant.   -     LTG 4 Progress  New  -        Time Calculation    PT Goal Re-Cert Due Date  07/31/19  -       User Key  (r) = Recorded By, (t) = Taken By, (c) = Cosigned By    Initials Name Provider Type    Aixa Vickers, PT Physical Therapist          Therapy Education  Given: HEP  Program: New  How Provided: Verbal, Demonstration, Written  Provided to: Patient  Level of Understanding: Teach back education performed, Verbalized, Demonstrated               Time Calculation:   Start Time: 1306  Stop Time: 1400  Time Calculation (min): 54 min  Therapy Charges for Today     Code Description Service Date Service Provider Modifiers Qty    01451005383 HC PT EVAL MOD COMPLEXITY 4 7/3/2019 Aixa Villegas, PT GP 1                  Aixa Villegas, PT  7/3/2019

## 2019-07-17 ENCOUNTER — HOSPITAL ENCOUNTER (OUTPATIENT)
Dept: PHYSICAL THERAPY | Facility: HOSPITAL | Age: 49
Setting detail: THERAPIES SERIES
Discharge: HOME | End: 2019-07-17

## 2019-07-17 DIAGNOSIS — N94.2 VAGINISMUS: ICD-10-CM

## 2019-07-17 DIAGNOSIS — R10.2 PELVIC PAIN IN FEMALE: Primary | ICD-10-CM

## 2019-07-17 PROCEDURE — 97112 NEUROMUSCULAR REEDUCATION: CPT | Performed by: PHYSICAL THERAPIST

## 2019-07-17 PROCEDURE — 97140 MANUAL THERAPY 1/> REGIONS: CPT | Performed by: PHYSICAL THERAPIST

## 2019-07-17 NOTE — THERAPY TREATMENT NOTE
Outpatient Physical Therapy Pelvic Health Treatment Note  [facility]     Patient Name: Saundra Christiansen  : 1970  MRN: [id number]  Today's Date: 2019        Visit Date: 2019  Visit Number:   Insurance: Medicare  Recheck: 19    Visit Dx:    ICD-10-CM ICD-9-CM   1. Pelvic pain in female R10.2 625.9   2. Vaginismus N94.2 625.1       There is no problem list on file for this patient.       Pelvic Health     Row Name 19 1500             Subjective Comments    Subjective Comments  Fell down stairs last week.  Bruised along midline of LB and buttock toward the L side. No radiographs taken.  Couldn't move for the first 24 hours.  Feels like muscles are bruised.  Went down with an angle and hurts in abdomen mainly on L side. Completed diary to best of her ability.   -SW         Pregnancy Questions    Number of Pregnancies  2  -SW      Number of Children  1  -SW      Type of Previous Deliveries  Vaginal  -         Pain Assessment    Pain Assessment  0-10  -      Pain Score  7  -SW      Post Pain Score  5  -SW         Pelvic Floor Muscle    Symmetry of Sustained Maximal Contraction  --  -SW      Endurance (Ability to Hold Maximal Contraction)  --  -SW      # of Reps of Maximal Contractions while Maintaining Endurance and Strength  --  -SW      Fast Contraction (# of 1 sec contractions performed)  --  -         Observations    Perineal Observation Performed?  --  -SW         Observation of Contraction in Perineum    Anal [place]  --  -SW      Perineal Body Lift  --  -SW         Prolapse (Pop-Q)    Cystocele  --  -         Education Provided On:    Education Points  --  -        User Key  (r) = Recorded By, (t) = Taken By, (c) = Cosigned By    Initials Name Provider Type    SW Aixa Villegas PT Physical Therapist        PT Ortho     Row Name 19 1500       Subjective Pain    Able to rate subjective pain?  yes  -    Pre-Treatment Pain Level  7  -SW    Post-Treatment Pain  Level  5  -SW    Subjective Pain Comment  LB and tailbone  -SW       Posture/Observations    Posture- WNL  Posture is WNL  -SW    Posture/Observations Comments  patient ambulates without distress.  Equal WB bilaterally. Sit to stands without assist.  Brusing noted along midline of sacrum and into medial cheek of L side.   -      User Key  (r) = Recorded By, (t) = Taken By, (c) = Cosigned By    Initials Name Provider Type    Aixa Vickers, PT Physical Therapist          Bladder and/or Bowel Diary was reviewed this date and education completed as follows based on objective information given in diary.    Normal bladder and bowel anatomy was introduced as to better understand their function.  The normal voiding range was given with average of 6-8 times during a 24 hour period, with average of nocturia up to 1 per night post menopausal.  Urine stream was discussed as to not force, push, or strain to initiate or empty the flow of urine.  Proper toileting was reviewed as to improve overall evacuation for bladder and bowel systems.  Patient demonstrated this technique via verbal and demonstrative techniques shown by the therapist.  Three primary functions of the pelvic floor were discussed to include 1) sexual appreciation, 2) support of internal structures, and 3) sphincteric control.  Additionally, normal bladder and bowel function was discussed and patient's values, as per his/her diary, were presented and compared to that of normal function.    Patient's diary reveals 4-11 voids total per day with 1-2 average episodes of nocturia per night.  Patient void schedule varied with increments of 30 minutes to 5+ hour increments.  Patient presented with 1-3 episodes of leakage per day.    Patient's level of urgency ranged from 1-3 in terms of strength with 1=min urge, 2=moderate urge and 3=strong urge.  Patient received education on the urge signal that one feels as the bladder wall stretches to fill with urine.   Three stage scale given with descriptor of the stages identified as stated above.  Urge suppression techniques to help control the urge and behaviorally manage urination and/or bowel schedule were taught during this time.    Good fluid intake was discussed as to aid in the promotion of good bladder health.  Hydration formula using body weight guide was used to calculate potential need for full hydration.  Per the bladder review, the patient consumed 12-42 total fluid oz per day with majority water consumption.  Per the formula, a hydration goal of 84 oz per day with 28 oz irritant fluids based upon body weight formula.    Dietary intake was discussed this visit as to how it too can affect the overall bladder health.  Handout was issued, as well as thorough discussion given as to irritants to bladder and how to categorize those irritants to improve overall bladder health. Suggestions were given based on response of patient's dietary intake per diary.           PT Assessment/Plan     Row Name 07/17/19 1500          PT Assessment    Functional Limitations  Performance in self-care ADL;Performance in leisure activities;Limitations in community activities;Other (comment) UI  -SW     Impairments  Impaired muscle endurance;Impaired muscle length;Impaired muscle power;Muscle strength;Pain;Other (comment) UI  -SW     Assessment Comments  Patient verbalized and demonstrated techniques given to her for home management to promote relaxation and improve tissue mobility. patient needs bladder retraining as was initiated this visit but will need to be advanced where applicable.   -SW     Rehab Potential  Good  -SW     Patient/caregiver participated in establishment of treatment plan and goals  Yes  -SW     Patient would benefit from skilled therapy intervention  Yes  -SW        PT Plan    PT Frequency  1x/week  -SW     Predicted Duration of Therapy Intervention (Therapy Eval)  8-12 visits  -SW     PT Plan Comments  Assess  behavioral management, begin downtraining to PF, establish HEP for stretching fo relaxation and elongation.   -       User Key  (r) = Recorded By, (t) = Taken By, (c) = Cosigned By    Initials Name Provider Type    Aixa Vickers, PT Physical Therapist            Exercises     Row Name 07/17/19 1500             Subjective Comments    Subjective Comments  Fell down stairs last week.  Bruised along midline of LB and buttock toward the L side. No radiographs taken.  Couldn't move for the first 24 hours.  Feels like muscles are bruised.  Went down with an angle and hurts in abdomen mainly on L side. Completed diary to best of her ability.   -         Subjective Pain    Able to rate subjective pain?  yes  -      Pre-Treatment Pain Level  7  -      Post-Treatment Pain Level  5  -      Subjective Pain Comment  LB and tailbone  -         Exercise 1    Exercise Name 1  behavioral management education for bladder  -SW      Additional Comments  see chart note for details  -         Exercise 2    Exercise Name 2  abdominal massage educaton, demo with teach back.  -         Exercise 3    Exercise Name 3  urge suppression  -        User Key  (r) = Recorded By, (t) = Taken By, (c) = Cosigned By    Initials Name Provider Type    Aixa Vickers, PT Physical Therapist           Manual Rx (last 36 hours)      Manual Treatments     Row Name 07/17/19 1700             Manual Rx 1    Manual Rx 1 Location  abdominal massage for relaxation.    -        User Key  (r) = Recorded By, (t) = Taken By, (c) = Cosigned By    Initials Name Provider Type    Aixa Vickers, PT Physical Therapist                    PT OP Goals     Row Name 07/17/19 1500          PT Short Term Goals    STG Date to Achieve  07/31/19  -     STG 1  patient to demonstrate abd release and comply daily with application.  -     STG 1 Progress  Progressing  -     STG 2  patient to show increase PF activation without overflow of  TA with PF recruitment.   -     STG 2 Progress  New  -     STG 3  patient to perform diaphragmatic breathing with correct coordination of PF excursion to appropriately support relaxation and normalized PF functions.  -[facility] 3 Progress  New  -[facility] 4  patient to perform toileting positions to promote improved defecation and urination without pain and improve emptying that may improve LAMIN.  -     STG 4 Progress  Progressing  -[facility] 5  patient to show normal resting tone to PF in supine position consistently x 3 minutes without erratic behavior noted.   -[facility] 5 Progress  New  [facility]        Long Term Goals    LTG Date to Achieve  11/27/19  -     LTG 1  subjectively improve pelvic pain by 70%   -     LTG 1 Progress  New  -     LTG 2  patient to demo no abdominal triggers and improved fascial glide without pain   -     LTG 2 Progress  New  -     LTG 3  patient to demonstrate normal coordination of SEMG in supine/seated postures such that contractions are normalized, resting tone rapidly achieved pre/post contraction, and with good amplitudes of movement noted.   -     LTG 3 Progress  New  -     LTG 4  patient to report pain as mild vs mod/severe and intermittent in nature vs constant.   -     LTG 4 Progress  New  -        Time Calculation    PT Goal Re-Cert Due Date  07/31/19  -       User Key  (r) = Recorded By, (t) = Taken By, (c) = Cosigned By    Initials Name Provider Type    Aixa Vickers, PT Physical Therapist           Therapy Education  Education Details: behavioral management; abdominal release  Program: New  How Provided: Verbal, Demonstration, Written  Provided to: Patient  Level of Understanding: Teach back education performed, Verbalized, Demonstrated              Time Calculation:   Start Time: 1512  Stop Time: 1608  Time Calculation (min): 56 min  Therapy Charges for Today     Code Description Service Date Service Provider Modifiers Qty    16174809619   PT NEUROMUSC RE EDUCATION EA 15 MIN 7/17/2019 Aixa Villegas, PT GP 3    [id number] HC PT MANUAL THERAPY EA 15 MIN 7/17/2019 Aixa Villegas, PT GP 1                    Aixa Villegas, PT  7/17/2019

## 2019-07-23 ENCOUNTER — HOSPITAL ENCOUNTER (OUTPATIENT)
Dept: PHYSICAL THERAPY | Facility: HOSPITAL | Age: 49
Setting detail: THERAPIES SERIES
Discharge: HOME | End: 2019-07-23

## 2019-07-23 DIAGNOSIS — N94.2 VAGINISMUS: ICD-10-CM

## 2019-07-23 DIAGNOSIS — R10.2 PELVIC PAIN IN FEMALE: Primary | ICD-10-CM

## 2019-07-23 PROCEDURE — 97110 THERAPEUTIC EXERCISES: CPT | Performed by: PHYSICAL THERAPIST

## 2019-07-23 PROCEDURE — 97535 SELF CARE MNGMENT TRAINING: CPT | Performed by: PHYSICAL THERAPIST

## 2019-07-23 NOTE — THERAPY TREATMENT NOTE
Outpatient Physical Therapy Pelvic Health Treatment Note  [facility]     Patient Name: Saundra Christiansen  : 1970  MRN: [id number]  Today's Date: 2019        Visit Date: 2019  Visit Number: 3/3  Insurance: Medicare/[employer]  Recheck: 19  Improvement: 0%    Visit Dx:    ICD-10-CM ICD-9-CM   1. Pelvic pain in female R10.2 625.9   2. Vaginismus N94.2 625.1       There is no problem list on file for this patient.       Pelvic Health     Row Name 19 0900             Pregnancy Questions    Number of Pregnancies  2  -SW      Number of Children  1  -SW      Type of Previous Deliveries  Vaginal  -SW         Pain Assessment    Pain Assessment  0-10  -SW      Pain Score  8  -SW      Post Pain Score  8  -SW         Pelvic Floor Muscle    Patient/Parent/Guardian Consented to Internal Pelvic Floor Exam  Yes  -SW      Strength (Right)  3: Squeeze with/without lift  -SW      Strength (Left)  3: Squeeze with/without lift  -SW      Symmetry of Sustained Maximal Contraction  Symmetrical  -SW      Endurance (Ability to Hold Maximal Contraction)  5 sec  -SW      # of Reps of Maximal Contractions while Maintaining Endurance and Strength  2 sets  -SW      Fast Contraction (# of 1 sec contractions performed)  10  -SW      Internal Pelvic Floor Comments  unchanged  -SW      External Pelvic Floor Comments  unchanged from evaluation  -SW         Observations    Perineal Observation Performed?  Yes  -SW      Posture Observations  unremarkable external examination.   -SW         SEMG Evaluation    Pelvic Floor Electrode  Internal Vaginal  -SW      Baseline Resting  Yes  -SW      SEMG Evaluation Comments  normal baselines to PF consistent and stable. Shows Positive activation and recruitment to PF with QF though amplitudes slightly lower at 3.5 to 5.0 mv.  Endurance x 5 sec this date with fair/good stability and min use of TA noted. No inc in pain noted with SEMG or PF assessment.   -SW        User Key  (r)  "= Recorded By, (t) = Taken By, (c) = Cosigned By    Initials Name Provider Type    Aixa Vickers, PT Physical Therapist        PT Ortho     Row Name 07/23/19 0900       Subjective Comments    Subjective Comments  Patient reports impaction since last visit until yesterday.  She then wet the bed.  Amtiza and fiber supplements with little change.   Pressure was great.  Had to pull out the \"big guns.\"  Couldn't hardly walk due to pain. Initially thought it was the exercises or massage.  Just not sure. Has been vomiting as well.  Endoscopy in Dec finding an ulcer in stomach.  Also couple of polyps.  Has had a history of C-diff two times back to back.  Thoughts of fecal transplant but pathology resumed normal. Admits that kegel contractions help dec urine in bladder with improvements of emptying. Void schedule about every 2 hours.  Averages about 64 oz per day.   -SW       Subjective Pain    Able to rate subjective pain?  yes  -SW    Pre-Treatment Pain Level  8  -SW    Post-Treatment Pain Level  8  -SW       Posture/Observations    Posture/Observations Comments  patient visually not feeling well this date.  Vomiting present during treatment but agrees to complete treatment.   -SW      User Key  (r) = Recorded By, (t) = Taken By, (c) = Cosigned By    Initials Name Provider Type    Aixa Vickers, PT Physical Therapist                     PT Assessment/Plan     Row Name 07/23/19 1000          PT Assessment    Functional Limitations  Performance in self-care ADL;Performance in leisure activities;Limitations in community activities;Other (comment) UI  -SW     Impairments  Impaired muscle endurance;Impaired muscle length;Impaired muscle power;Muscle strength;Pain;Other (comment) UI  -SW     Assessment Comments  Patient not feeling well this visit, however, determined to participate with therapy intervention. She demonstrated good awareness to PF with normalized tone to SEMG in supine position pre/post " "contractions.  Endurance was fair/good at 5 sec with stability and min use of TA.  Patient struggling with normalacy to bowel system.  She reported episode of impaction since last visit.  Discussed dietary changes and hydration goals, while encouraging continuation of medication as prescribed.  Per discussion, patient was attempting to modify meds given for bowel regularity as stool was too thin, thinking it would thicken her stool.  Instead, she became impacted. Encouraged consultation with MD prior to those changes in order to avoid instances as experienced.    -SW     Rehab Potential  Good  -SW     Patient/caregiver participated in establishment of treatment plan and goals  Yes  -SW     Patient would benefit from skilled therapy intervention  Yes  -SW        PT Plan    PT Frequency  1x/week  -SW     Predicted Duration of Therapy Intervention (Therapy Eval)  8-12 visits  -     PT Plan Comments  continue with PF assessment on SEMG in seated position with simulated defecation to ensure normal tone and coordination involved.  Review behavioral managment and address changes as needed.   -       User Key  (r) = Recorded By, (t) = Taken By, (c) = Cosigned By    Initials Name Provider Type    Aixa Vickers, PT Physical Therapist            Exercises     Row Name 07/23/19 0900             Subjective Comments    Subjective Comments  Patient reports impaction since last visit until yesterday.  She then wet the bed.  Amtiza and fiber supplements with little change.   Pressure was great.  Had to pull out the \"big guns.\"  Couldn't hardly walk due to pain. Initially thought it was the exercises or massage.  Just not sure. Has been vomiting as well.  Endoscopy in Dec finding an ulcer in stomach.  Also couple of polyps.  Has had a history of C-diff two times back to back.  Thoughts of fecal transplant but pathology resumed normal. Admits that kegel contractions help dec urine in bladder with improvements of emptying. " Void schedule about every 2 hours.  Averages about 64 oz per day.   -SW         Subjective Pain    Able to rate subjective pain?  yes  -SW      Pre-Treatment Pain Level  8  -SW      Post-Treatment Pain Level  8  -SW         Exercise 1    Exercise Name 1  behavioral management review  -SW      Additional Comments  discussed sources of dietary fiber to assist bowel activity; insoluble vs soluble fiber sources.  Also encouraged use of medication as prescribed.  Cont void schedule of every 2 hours.   -SW         Exercise 2    Exercise Name 2  abdominal massage review  -SW      Additional Comments  review application and its use as well as promotion of compliance  -SW         Exercise 3    Exercise Name 3  SEMG resting  -SW      Additional Comments  normal baseline achieved.   -SW         Exercise 4    Exercise Name 4  SEMG QF supine  -SW      Sets 4  2  -SW      Reps 4  10  -SW      Additional Comments  normal contraction to PF with full return to baseline post contraction. Amplitudes of approx 5mv with full return to baseline post contraction.   -SW         Exercise 5    Exercise Name 5  SEMG endurance supine  -SW      Sets 5  2  -SW      Reps 5  5  -SW      Time 5  5 sec  -SW      Additional Comments  Fair to good stability with endurance contractions.  min use of TA noted.   -        User Key  (r) = Recorded By, (t) = Taken By, (c) = Cosigned By    Initials Name Provider Type    Aixa Vickers, PT Physical Therapist                      PT OP Goals     Row Name 07/23/19 1000          PT Short Term Goals    STG Date to Achieve  07/31/19  -     STG 1  patient to demonstrate abd release and comply daily with application.  -SW     STG 1 Progress  Progressing  -     STG 2  patient to show increase PF activation without overflow of TA with PF recruitment.   -     STG 2 Progress  Progressing  -     STG 3  patient to perform diaphragmatic breathing with correct coordination of PF excursion to appropriately  support relaxation and normalized PF functions.  -     STG 3 Progress  New  -[facility] 4  patient to perform toileting positions to promote improved defecation and urination without pain and improve emptying that may improve LAMIN.  -     STG 4 Progress  Progressing  -[facility] 5  patient to show normal resting tone to PF in supine position consistently x 3 minutes without erratic behavior noted.   -     STG 5 Progress  Progressing  -[facility] 5 Progress Comments  needs consistency  -        Long Term Goals    LTG Date to Achieve  11/27/19  -     LTG 1  subjectively improve pelvic pain by 70%   -     LTG 1 Progress  New  -     LTG 2  patient to demo no abdominal triggers and improved fascial glide without pain   -     LTG 2 Progress  New  -     LTG 3  patient to demonstrate normal coordination of SEMG in supine/seated postures such that contractions are normalized, resting tone rapidly achieved pre/post contraction, and with good amplitudes of movement noted.   -     LTG 3 Progress  New  -     LTG 4  patient to report pain as mild vs mod/severe and intermittent in nature vs constant.   -     LTG 4 Progress  New  -        Time Calculation    PT Goal Re-Cert Due Date  07/31/19  -       User Key  (r) = Recorded By, (t) = Taken By, (c) = Cosigned By    Initials Name Provider Type     Aixa Villegas, MOOKIE Physical Therapist           Therapy Education  Given: HEP  Program: New, Reinforced  How Provided: Verbal, Demonstration, Written  Provided to: Patient  Level of Understanding: Teach back education performed, Verbalized, Demonstrated              Time Calculation:   Start Time: 0922  Stop Time: 1024  Time Calculation (min): 62 min  Therapy Charges for Today     Code Description Service Date Service Provider Modifiers Qty    34952479060  PT THER PROC EA 15 MIN 7/23/2019 Aixa Villegas, MOOKIE GP 2    [id number]  PT SELF CARE/MGMT/TRAIN EA 15 MIN 7/23/2019 Aixa Villegas, MOOKIE  GP 2    [id number]  PT THER SUPP EA 15 MIN 7/23/2019 Aixa Villegas, PT GP 1                    Aixa Villegas, PT  7/23/2019

## 2019-07-24 ENCOUNTER — TRANSCRIBE ORDERS (OUTPATIENT)
Dept: PHYSICAL THERAPY | Facility: HOSPITAL | Age: 49
End: 2019-07-24

## 2019-07-24 DIAGNOSIS — N94.2 VAGINISMUS: Primary | ICD-10-CM

## 2019-07-31 ENCOUNTER — HOSPITAL ENCOUNTER (OUTPATIENT)
Dept: PHYSICAL THERAPY | Facility: HOSPITAL | Age: 49
Setting detail: THERAPIES SERIES
Discharge: HOME | End: 2019-07-31

## 2019-07-31 DIAGNOSIS — R10.2 PELVIC PAIN IN FEMALE: Primary | ICD-10-CM

## 2019-07-31 DIAGNOSIS — N94.2 VAGINISMUS: ICD-10-CM

## 2019-07-31 PROCEDURE — 97140 MANUAL THERAPY 1/> REGIONS: CPT | Performed by: PHYSICAL THERAPIST

## 2019-07-31 PROCEDURE — 97110 THERAPEUTIC EXERCISES: CPT | Performed by: PHYSICAL THERAPIST

## 2019-08-07 ENCOUNTER — HOSPITAL ENCOUNTER (OUTPATIENT)
Dept: PHYSICAL THERAPY | Facility: HOSPITAL | Age: 49
Setting detail: THERAPIES SERIES
Discharge: HOME | End: 2019-08-07

## 2019-08-07 DIAGNOSIS — R10.2 PELVIC PAIN IN FEMALE: Primary | ICD-10-CM

## 2019-08-07 DIAGNOSIS — N94.2 VAGINISMUS: ICD-10-CM

## 2019-08-07 PROCEDURE — 97110 THERAPEUTIC EXERCISES: CPT | Performed by: PHYSICAL THERAPIST

## 2019-08-07 PROCEDURE — 97140 MANUAL THERAPY 1/> REGIONS: CPT | Performed by: PHYSICAL THERAPIST

## 2019-08-07 NOTE — THERAPY TREATMENT NOTE
Outpatient Physical Therapy Pelvic Health Treatment Note  [facility]     Patient Name: Saundra Christiansen  : 1970  MRN: [id number]  Today's Date: 2019        Visit Date: 2019  Visit number:   Insurance: Medicare/[employer]  Recheck: 19  Improvement: 50-60%    Visit Dx:    ICD-10-CM ICD-9-CM   1. Pelvic pain in female R10.2 625.9   2. Vaginismus N94.2 625.1       There is no problem list on file for this patient.       Pelvic Health     Row Name 19 1500             Pregnancy Questions    Number of Pregnancies  2  -SW      Number of Children  1  -SW      Type of Previous Deliveries  Vaginal  -SW         Pain Assessment    Pain Score  4  -SW      Post Pain Score  2  -SW         Pelvic Floor Muscle    External Pelvic Floor Comments  Adherence to ant scar of abdomen.  Piriformis without trigger.  Min tension along iliopsoas of R side, none on L.   -SW        User Key  (r) = Recorded By, (t) = Taken By, (c) = Cosigned By    Initials Name Provider Type    SW Aixa Villegas, PT Physical Therapist        PT Ortho     Row Name 19 1500       Subjective Comments    Subjective Comments  Started new medication and had side effect this morning.  Rescheduled for this afternoon.  Pill makes me feel wiped out. Sore about 2.5 days but you got the knot out of my butt.  We made progress last visit.  Pressure still there but not as much intensity.  Bowels up/down but managed.  Bladder with min leaking usually when she has the urge.   -SW       Subjective Pain    Able to rate subjective pain?  yes  -SW    Pre-Treatment Pain Level  4  -SW    Post-Treatment Pain Level  2  -SW       Posture/Observations    Posture/Observations Comments  happy demeanor.  Mood and judgement normal.   -SW       Lumbosacral Palpation    Lumbosacral Segment  Tender  -SW    Piriformis  -- no muscular spasm noted.   -SW    Coccyx  -- negative ttp  -SW    Iliopsoas  -- mild tension to R side, none to L  -SW      User  Key  (r) = Recorded By, (t) = Taken By, (c) = Cosigned By    Initials Name Provider Type    Aixa Vickers, PT Physical Therapist                     PT Assessment/Plan     Row Name 08/07/19 1700          PT Assessment    Functional Limitations  Performance in self-care ADL;Performance in leisure activities;Limitations in community activities;Other (comment) UI  -SW     Impairments  Impaired muscle endurance;Impaired muscle length;Impaired muscle power;Muscle strength;Pain;Other (comment) UI  -SW     Assessment Comments  Patient is progressig with treatment, though results have been very slow in progress.  Her condition is made complicated by multiple co-morbidities of bowel, bladder and musculoskeletal ailments.  She is responding to treatments. All STG met and working toward progression of LTG.  -     Rehab Potential  Good  -SW     Patient/caregiver participated in establishment of treatment plan and goals  Yes  -SW     Patient would benefit from skilled therapy intervention  Yes  -SW        PT Plan    PT Frequency  1x/week  -SW     Predicted Duration of Therapy Intervention (Therapy Eval)  8-12 visits  -     PT Plan Comments  Consider internal pelvic release and potential for revisit of SEMG.  -       User Key  (r) = Recorded By, (t) = Taken By, (c) = Cosigned By    Initials Name Provider Type    Aixa Vickers, PT Physical Therapist            Exercises     Row Name 08/07/19 1500             Subjective Comments    Subjective Comments  Started new medication and had side effect this morning.  Rescheduled for this afternoon.  Pill makes me feel wiped out. Sore about 2.5 days but you got the knot out of my butt.  We made progress last visit.  Pressure still there but not as much intensity.  Bowels up/down but managed.  Bladder with min leaking usually when she has the urge.   -         Subjective Pain    Able to rate subjective pain?  yes  -SW      Pre-Treatment Pain Level  4  -SW       Post-Treatment Pain Level  2  -SW         Exercise 1    Exercise Name 1  piriformis stretch   -SW      Reps 1  3  -SW      Time 1  30 sec  -SW         Exercise 2    Exercise Name 2  SKTC stretch   -SW      Reps 2  3  -SW      Time 2  30 sec  -SW         Exercise 3    Exercise Name 3  Hip flexor stretch   -SW      Reps 3  3  -SW      Time 3  30 sec  -SW        User Key  (r) = Recorded By, (t) = Taken By, (c) = Cosigned By    Initials Name Provider Type     Aixa Villegas PT Physical Therapist           Manual Rx (last 36 hours)      Manual Treatments     Row Name 08/07/19 1500             Manual Rx 1    Manual Rx 1 Location  iliopsoas release bilaterally  -SW      Manual Rx 1 Type  strain/counterstrain  -SW         Manual Rx 2    Manual Rx 2 Location  piriformis trigger release  -SW      Manual Rx 2 Type  MFR and cupping L side  -SW         Manual Rx 3    Manual Rx 3 Location  levator ani group release bilaterally  -SW         Manual Rx 4    Manual Rx 4 Location  coccygeus release bilaterally  -SW        User Key  (r) = Recorded By, (t) = Taken By, (c) = Cosigned By    Initials Name Provider Type     Aixa Villegas PT Physical Therapist                         Therapy Education  Given: HEP  Program: Reinforced  How Provided: Verbal, Demonstration  Provided to: Patient  Level of Understanding: Teach back education performed, Verbalized, Demonstrated              Time Calculation:   Start Time: 1503  Stop Time: 1601  Time Calculation (min): 58 min  Therapy Charges for Today     Code Description Service Date Service Provider Modifiers Qty    53192714620 HC PT MANUAL THERAPY EA 15 MIN 8/7/2019 Aixa Villegas, PT GP 3    14160118511 HC PT THER PROC EA 15 MIN 8/7/2019 Aixa Villegas, PT GP 1                    Aixa Villegas PT  8/7/2019

## 2019-08-14 ENCOUNTER — HOSPITAL ENCOUNTER (OUTPATIENT)
Dept: PHYSICAL THERAPY | Facility: HOSPITAL | Age: 49
Setting detail: THERAPIES SERIES
Discharge: HOME | End: 2019-08-14

## 2019-08-14 DIAGNOSIS — N94.2 VAGINISMUS: ICD-10-CM

## 2019-08-14 DIAGNOSIS — R10.2 PELVIC PAIN IN FEMALE: Primary | ICD-10-CM

## 2019-08-14 PROCEDURE — 97110 THERAPEUTIC EXERCISES: CPT | Performed by: PHYSICAL THERAPIST

## 2019-08-14 NOTE — THERAPY TREATMENT NOTE
Outpatient Physical Therapy Pelvic Health Treatment Note  [facility]     Patient Name: Saundra Christiansen  : 1970  MRN: [id number]  Today's Date: 2019        Visit Date: 2019  Visit Number:   Insurance: Medicare/[employer]  Recheck: 19  Improvement: 70%    Visit Dx:    ICD-10-CM ICD-9-CM   1. Pelvic pain in female R10.2 625.9   2. Vaginismus N94.2 625.1       There is no problem list on file for this patient.       Pelvic Health     Row Name 19 1000             Subjective Comments    Subjective Comments  Been doing good.  Pain at least 70% better.  The soreness has really gone away.  Doesn't have the leg pain as much as she did.  Tightness decreased overall.  Not sexually active so unsure of pelvic pain.  Abdominal pain is better as well.  Not as intense and feels so much looser since induction to PT. I was amazed at home much the cupping loosened things up.  Leakage is very minimum.  Feels she is doing really good from pain perspective but wants to see if can rid of leakage or not.   -SW         Pregnancy Questions    Number of Pregnancies  2  -SW      Number of Children  1  -SW      Type of Previous Deliveries  Vaginal  -SW         Pain Assessment    Pain Assessment  0-10  -SW      Pain Score  5 general malaise and discomfort  -SW      Post Pain Score  5  -SW         Pelvic Floor Muscle    Patient/Parent/Guardian Consented to Internal Pelvic Floor Exam  Yes  -SW      Strength (Right)  3: Squeeze with/without lift  -SW      Strength (Left)  3: Squeeze with/without lift  -SW      Symmetry of Sustained Maximal Contraction  Symmetrical  -SW      Endurance (Ability to Hold Maximal Contraction)  6 sec  -SW      # of Reps of Maximal Contractions while Maintaining Endurance and Strength  10 sets  -SW      Fast Contraction (# of 1 sec contractions performed)  10  -SW      Internal Pelvic Floor Comments  Pubococcygeus tenderness remains along with superficial transverse perineal.   Remains with slight drop to bladder but stable.   -SW      External Pelvic Floor Comments  non tender to surrounding regions of ant/post pelvis.   -SW         Observations    Perineal Observation Performed?  Yes  -SW      Posture Observations  No distress. Good spirits.  Normal gait and stature.   -SW         Observation of Contraction in Perineum    Anal [place]  Present  -SW      Perineal Body Lift  Present  -SW         Prolapse (Pop-Q)    Cystocele  Stage II  -SW         SEMG Evaluation    Pelvic Floor Electrode  Internal Vaginal  -SW      Baseline Resting  Yes  -SW      SEMG Evaluation Comments  continues with normal baselines to PF in supine resting position. QF noted with isolation but with low amplitudes of 3.4 max.  Endurance x 6 sec holds with isolation and ability to repeat x 10 reps.  Undelayed relaxation noted post contractions.   -SW         Education Provided On:    Education Points  HEP;Behavioral modifications  -      Method of Delivery  Verbal;Demonstration;Written  -      Education Provided To  Patient  -SW      Level of Understanding  Teach back education performed;Verbalized;Demonstrated  -SW      HEP Comments  begin HEP of uptraining 6 sec hold x 10 reps and QF post void.   -SW        User Key  (r) = Recorded By, (t) = Taken By, (c) = Cosigned By    Initials Name Provider Type    SW Aixa Villegas, PT Physical Therapist                       PT Assessment/Plan     Row Name 08/14/19 1000          PT Assessment    Functional Limitations  Performance in self-care ADL;Performance in leisure activities;Limitations in community activities;Other (comment) UI  -SW     Impairments  Impaired muscle endurance;Impaired muscle length;Impaired muscle power;Muscle strength;Pain;Other (comment) UI  -SW     Assessment Comments  Patient impressed with mobility she has noted from past treatments.  Responded well to cupping and manual work and feels she is ready to move forward with UI.  She shows low  amplitudes of contraction with PF in supine position, however, contractions are controlled and isolated.  She would benefit from uptraining regimen to assist with UI and possibly stimulation should it continue post institution of HEP.  Working toward goals.  Good compliance.   -SW        PT Plan    PT Frequency  1x/week  -     Predicted Duration of Therapy Intervention (Therapy Eval)  8-12 visits  -     PT Plan Comments  Consider stimulation if UI continues post institution of uptrain to PF.   -       User Key  (r) = Recorded By, (t) = Taken By, (c) = Cosigned By    Initials Name Provider Type    Aixa Vickers, PT Physical Therapist            Exercises     Row Name 08/14/19 1000             Subjective Comments    Subjective Comments  Been doing good.  Pain at least 70% better.  The soreness has really gone away.  Doesn't have the leg pain as much as she did.  Tightness decreased overall.  Not sexually active so unsure of pelvic pain.  Abdominal pain is better as well.  Not as intense and feels so much looser since induction to PT. I was amazed at home much the cupping loosened things up.  Leakage is very minimum.  Feels she is doing really good from pain perspective but wants to see if can rid of leakage or not.   -SW         Exercise 1    Exercise Name 1  SEMG resting  -SW      Additional Comments  continues with normal resting tone  -SW         Exercise 2    Exercise Name 2  SEMG QF supine  -SW      Sets 2  2  -SW      Reps 2  10  -SW      Additional Comments  able to show good recruitment with isolation to PF.  Amplitudes are low 3.4 mx.  -SW         Exercise 3    Exercise Name 3  SEMG endurance supine  -SW      Sets 3  2  -SW      Reps 3  10  -SW      Time 3  6 sec  -SW      Additional Comments  good stability with endurance contractions of 6 sec hold x 10 reps.  Zipper concept worked better to increase recruitment to PF with inc stability.   -SW         Exercise 4    Exercise Name 4  toileting  position   -      Additional Comments  demo with explanation and return teach back with positioning to improve elimination of bladder. 5x post void contractions.   -        User Key  (r) = Recorded By, (t) = Taken By, (c) = Cosigned By    Initials Name Provider Type    Aixa Vickers, PT Physical Therapist                      PT OP Goals     Row Name 08/14/19 1000          PT Short Term Goals    STG Date to Achieve  08/28/19  -     STG 1  patient to demonstrate abd release and comply daily with application.  -     STG 1 Progress  Met  -     STG 2  patient to show increase PF activation without overflow of TA with PF recruitment.   -     STG 2 Progress  Met  -     STG 3  patient to perform diaphragmatic breathing with correct coordination of PF excursion to appropriately support relaxation and normalized PF functions.  -     STG 3 Progress  Met  -     STG 4  patient to perform toileting positions to promote improved defecation and urination without pain and improve emptying that may improve LAMIN.  -     STG 4 Progress  Met  -     STG 5  patient to show normal resting tone to PF in supine position consistently x 3 minutes without erratic behavior noted.   -     STG 5 Progress  Met  -        Long Term Goals    LTG Date to Achieve  11/27/19  -     LTG 1  subjectively improve pelvic pain by 70%   -     LTG 1 Progress  Met  -     LTG 2  patient to demo no abdominal triggers and improved fascial glide without pain   -     LTG 2 Progress  Met  -     LTG 3  patient to demonstrate normal coordination of SEMG in supine/seated postures such that contractions are normalized, resting tone rapidly achieved pre/post contraction, and with good amplitudes of movement noted.   -     LTG 3 Progress  Progressing  -     LTG 4  patient to report pain as mild vs mod/severe and intermittent in nature vs constant.   -     LTG 4 Progress  Progressing  -        Time Calculation    PT Goal  Re-Cert Due Date  08/28/19  -       User Key  (r) = Recorded By, (t) = Taken By, (c) = Cosigned By    Initials Name Provider Type    Aixa Vickers, MOOKIE Physical Therapist           Therapy Education  Given: HEP, Symptoms/condition management  Program: Reinforced, Progressed  How Provided: Verbal, Demonstration  Provided to: Patient  Level of Understanding: Teach back education performed, Verbalized, Demonstrated              Time Calculation:   Start Time: 1015  Stop Time: 1109  Time Calculation (min): 54 min  Therapy Charges for Today     Code Description Service Date Service Provider Modifiers Qty    32328736238  PT THER PROC EA 15 MIN 8/14/2019 Aixa Villegas, PT GP 4                    Aixa Villegas PT  8/14/2019

## 2019-08-21 ENCOUNTER — APPOINTMENT (OUTPATIENT)
Dept: PHYSICAL THERAPY | Facility: HOSPITAL | Age: 49
End: 2019-08-21

## 2019-08-28 ENCOUNTER — HOSPITAL ENCOUNTER (OUTPATIENT)
Dept: PHYSICAL THERAPY | Facility: HOSPITAL | Age: 49
Setting detail: THERAPIES SERIES
Discharge: HOME | End: 2019-08-28

## 2019-08-28 DIAGNOSIS — R10.2 PELVIC PAIN IN FEMALE: Primary | ICD-10-CM

## 2019-08-28 DIAGNOSIS — N94.2 VAGINISMUS: ICD-10-CM

## 2019-08-28 PROCEDURE — 97140 MANUAL THERAPY 1/> REGIONS: CPT | Performed by: PHYSICAL THERAPIST

## 2019-08-28 PROCEDURE — 97110 THERAPEUTIC EXERCISES: CPT | Performed by: PHYSICAL THERAPIST

## 2019-08-28 NOTE — THERAPY RE-EVALUATION
Outpatient Physical Therapy Pelvic Health Re-Assessment  [facility]     Patient Name: Saundra Christiansen  : 1970  MRN: [id number]  Today's Date: 2019        Visit Date: 2019  Visit Number:   Insurance: Medicare/[employer]  Recheck: 19  Improvement: 75%    There is no problem list on file for this patient.       Past Medical History:   Diagnosis Date   • Abdominal pain    • Arthritis    • Asthma    • Back pain    • Bleeding ulcer 2018    stomach   • Depression    • GERD (gastroesophageal reflux disease)    • Hypertension    • Pelvic pain    • PTSD (post-traumatic stress disorder)    • Stroke (CMS/HCC)         Past Surgical History:   Procedure Laterality Date   • APPENDECTOMY     • BACK SURGERY      L4/5 and L5/S1   • CHOLECYSTECTOMY     • HYSTERECTOMY     • SINUS SURGERY     • TUBAL ABDOMINAL LIGATION           Visit Dx:    ICD-10-CM ICD-9-CM   1. Pelvic pain in female R10.2 625.9   2. Vaginismus N94.2 625.1           Pelvic Health     Row Name 19 1400 19 1300          Subjective Comments    Subjective Comments  --  Has been a little under the weather.  Better now.  Pain has been very managed. Back and legs have been killing me when she didn't feel good.  But better now.  Bladder leaks are no longer every day.  Not waking up with dampness which is improved.  Has been using yoga poses to help with discomfort.  Subjectively 75% overall since induction of therapy.  Worst part now is the electical shock into vagina at random times. Has not been using Estrace cream for some time. Curious if this would contribute to inc sharp sensations to perineum.   -SW        Pregnancy Questions    Number of Pregnancies  --  2  -SW     Number of Children  --  1  -SW     Type of Previous Deliveries  --  Vaginal  -SW        Pain Assessment    Pain Assessment  --  0-10  -SW     Pain Score  --  5  -SW     Post Pain Score  --  3  -SW        Lumbosacral Palpation    Sacrotubrous Ligament   Right:;Guarded/taut  -SW  --     Pelvic Floor  -- see pelvic therapy comments  -SW  --     Lumbosacral Palpation Comments  Alignment presents normal.  No sign of rotation noted. Tender throughout intravaginal cuff.  See pelvic therapy comments.   -SW  --        Pelvic Floor Muscle    Patient/Parent/Guardian Consented to Internal Pelvic Floor Exam  Yes  -SW  --     Strength (Right)  3: Squeeze with/without lift  -SW  --     Strength (Left)  3: Squeeze with/without lift  -SW  --     Symmetry of Sustained Maximal Contraction  Symmetrical  -SW  --     Endurance (Ability to Hold Maximal Contraction)  6 sec  -SW  --     # of Reps of Maximal Contractions while Maintaining Endurance and Strength  10 sets  -SW  --     Fast Contraction (# of 1 sec contractions performed)  10  -SW  --     Internal Pelvic Floor Comments  Patient tender throughout intravaginal cuff this date.  Tissues present atrophic.  TTP noted along ischiocavernosus, pubococcygeus R>L.  Bilateral obt internus tenderness noted.  + sacrotuberous ligament tenderness noted R>L.   -SW  --     External Pelvic Floor Comments  Piriformis muscle presents with normal mobility and no triggering.  Alignment intact.   -SW  --        Observations    Perineal Observation Performed?  Yes  -SW  --     Posture Observations  Mood and judgement good this visit.  Patient presents in good spirits. Talkative without signs of distress in appearance.   -SW  --        Observation of Contraction in Perineum    Anal [place]  Present  -SW  --     Perineal Body Lift  Present  -SW  --        Prolapse (Pop-Q)    Cystocele  Stage II  -SW  --       User Key  (r) = Recorded By, (t) = Taken By, (c) = Cosigned By    Initials Name Provider Type    Aixa Vickers PT Physical Therapist        PT Ortho     Row Name 08/28/19 1300       Subjective Pain    Able to rate subjective pain?  yes  -    Pre-Treatment Pain Level  5  -SW    Post-Treatment Pain Level  3  -      User Key  (r) = Recorded  By, (t) = Taken By, (c) = Cosigned By    Initials Name Provider Type    Aixa Vickers, PT Physical Therapist                     PT Assessment/Plan     Row Name 08/28/19 1300          PT Assessment    Assessment Comments  Patient responded well to intravaginal cuff treatment for manual trigger release.  Tissues felt dehydrated with less suppleness noted.  No inc in pain noted and patient subjectively stating that she felt more relaxed post treatment. Patient is progressing well. She has had a few set backs during time with therapy, but has seemingly moved forward at a level pace.  Taking time, but achieving goals of now 75% improved.  Will continue to work towards inc in tissue motility and fascial release to help continue to reduce pain and improve overall function. No stimulation this date due to no UI reported.  -     Rehab Potential  Good  -     Patient/caregiver participated in establishment of treatment plan and goals  Yes  -     Patient would benefit from skilled therapy intervention  Yes  -SW        PT Plan    PT Frequency  -- return in 2 weeks  -     Predicted Duration of Therapy Intervention (Therapy Eval)  8-12 visits  -     PT Plan Comments  continue with internal manual work to release tissues.  Patient to follow up in 2 weeks to give adequate time to restart replense which will help with tissue hydration.  Will monitor results.   -       User Key  (r) = Recorded By, (t) = Taken By, (c) = Cosigned By    Initials Name Provider Type    Aixa Vickers, PT Physical Therapist            Exercises     Row Name 08/28/19 1300             Subjective Comments    Subjective Comments  Has been a little under the weather.  Better now.  Pain has been very managed. Back and legs have been killing me when she didn't feel good.  But better now.  Bladder leaks are no longer every day.  Not waking up with dampness which is improved.  Has been using yoga poses to help with discomfort.   "Subjectively 75% overall since induction of therapy.  Worst part now is the electical shock into vagina at random times. Has not been using Estrace cream for some time. Curious if this would contribute to inc sharp sensations to perineum.   -         Subjective Pain    Able to rate subjective pain?  yes  -      Pre-Treatment Pain Level  5  -SW      Post-Treatment Pain Level  3  -SW         Exercise 1    Exercise Name 1  Alignment check  -      Additional Comments  level and symmetrical  -         Exercise 2    Exercise Name 2  \"blow as you go\" concept   -      Additional Comments  demo with return teach back for mechanics using this conceptual teaching.  this is because patient doesn't want to place added pressure to bladder during daily functional activities. This concept helps to dec strain on bladder during functional activities. Patient able to demo teaching.   -         Exercise 3    Exercise Name 3  Reviewed HEP  -      Additional Comments  verbalized review and compliance with performance.   -        User Key  (r) = Recorded By, (t) = Taken By, (c) = Cosigned By    Initials Name Provider Type    Aixa Vickers PT Physical Therapist           Manual Rx (last 36 hours)      Manual Treatments     Row Name 08/28/19 1300             Manual Rx 1    Manual Rx 1 Location  pubococcygeus release  -SW         Manual Rx 2    Manual Rx 2 Location  obturator internus release  -SW         Manual Rx 3    Manual Rx 3 Location  coccygeus release  -SW         Manual Rx 4    Manual Rx 4 Location  PF muscle contraction with downtrain release  -        User Key  (r) = Recorded By, (t) = Taken By, (c) = Cosigned By    Initials Name Provider Type     Aixa Villegas PT Physical Therapist                    PT OP Goals     Row Name 08/28/19 1445 08/28/19 1400       PT Short Term Goals    STG 1  --  patient to demonstrate abd release and comply daily with application.  -    STG 1 Progress  --  Met  " -    STG 2  --  patient to show increase PF activation without overflow of TA with PF recruitment.   -[facility] 2 Progress  --  Met  -    STG 3  --  patient to perform diaphragmatic breathing with correct coordination of PF excursion to appropriately support relaxation and normalized PF functions.  -[facility] 3 Progress  --  Met  -    STG 4  --  patient to perform toileting positions to promote improved defecation and urination without pain and improve emptying that may improve LAMIN.  -[facility] 4 Progress  --  Met  -    STG 5  --  patient to show normal resting tone to PF in supine position consistently x 3 minutes without erratic behavior noted.   -[facility] 5 Progress  --  Met  [facility]       Long Term Goals    LTG Date to Achieve  --  11/27/19  -    LTG 1  --  subjectively improve pelvic pain by 70%   -    LTG 1 Progress  --  Met  -    LTG 2  --  patient to demo no abdominal triggers and improved fascial glide without pain   -    LTG 2 Progress  --  Met  -    LTG 3  --  patient to demonstrate normal coordination of SEMG in supine/seated postures such that contractions are normalized, resting tone rapidly achieved pre/post contraction, and with good amplitudes of movement noted.   -    LTG 3 Progress  --  Progressing  -    LTG 4  --  patient to report pain as mild vs mod/severe and intermittent in nature vs constant.   -    LTG 4 Progress  --  Progressing  -       Time Calculation    PT Goal Re-Cert Due Date  09/26/19  -  09/26/19  -      User Key  (r) = Recorded By, (t) = Taken By, (c) = Cosigned By    Initials Name Provider Type    Aixa Vickers, PT Physical Therapist          Therapy Education  Given: HEP  Program: Reinforced  How Provided: Verbal, Demonstration  Provided to: Patient  Level of Understanding: Teach back education performed, Verbalized, Demonstrated               Time Calculation:   Start Time: 1305  Stop Time: 1400  Time Calculation (min): 55 min  Therapy  Charges for Today     Code Description Service Date Service Provider Modifiers Qty    65402164494 HC PT MANUAL THERAPY EA 15 MIN 8/28/2019 Aixa Villegas, PT GP 3    67723277176 HC PT THER PROC EA 15 MIN 8/28/2019 Aixa Villegas, PT GP 1                  Aixa Villegas, PT  8/28/2019

## 2019-09-11 ENCOUNTER — APPOINTMENT (OUTPATIENT)
Dept: PHYSICAL THERAPY | Facility: HOSPITAL | Age: 49
End: 2019-09-11

## 2019-09-11 ENCOUNTER — HOSPITAL ENCOUNTER (OUTPATIENT)
Dept: PHYSICAL THERAPY | Facility: HOSPITAL | Age: 49
Setting detail: THERAPIES SERIES
Discharge: HOME | End: 2019-09-11

## 2019-09-11 DIAGNOSIS — R10.2 PELVIC PAIN IN FEMALE: Primary | ICD-10-CM

## 2019-09-11 DIAGNOSIS — N94.2 VAGINISMUS: ICD-10-CM

## 2019-09-11 PROCEDURE — 97535 SELF CARE MNGMENT TRAINING: CPT | Performed by: PHYSICAL THERAPIST

## 2019-09-11 PROCEDURE — 97110 THERAPEUTIC EXERCISES: CPT | Performed by: PHYSICAL THERAPIST

## 2019-09-11 NOTE — THERAPY TREATMENT NOTE
Outpatient Physical Therapy Pelvic Health Treatment Note  [facility]     Patient Name: Saundra Christiansen  : 1970  MRN: [id number]  Today's Date: 2019        Visit Date: 2019  Visit Number:   Insurance: Medicare/[employer]  Recheck: 19  Improvement: 75%    Visit Dx:    ICD-10-CM ICD-9-CM   1. Pelvic pain in female R10.2 625.9   2. Vaginismus N94.2 625.1       There is no problem list on file for this patient.       Pelvic Health     Row Name 19 1500             Pregnancy Questions    Number of Pregnancies  2  -SW      Number of Children  1  -SW      Type of Previous Deliveries  Vaginal  -SW         Pain Assessment    Pain Assessment  0-10  -SW      Pain Score  2  -SW      Post Pain Score  2  -SW         Pelvic Floor Muscle    Internal Pelvic Floor Comments  patient declined internal palpation to vaginal cuff.  Desires to continue with treatments a while longer to see if pain reduced.    -SW      External Pelvic Floor Comments  unremarkable.  No tenderness noted to piriformis muscle this date.   -         Education Provided On:    HEP Comments  continue with tissue mobilization as able. Stretching as assigned.   -        User Key  (r) = Recorded By, (t) = Taken By, (c) = Cosigned By    Initials Name Provider Type    SW Aixa Villegas, PT Physical Therapist        PT Ortho     Row Name 19 1500       Subjective Comments    Subjective Comments  Worker's comp claim has been settled and getting settlement.  This relieves some long over-due stress.  After last visit, patient reports no pain but bled x 3 days brownish discharge.  Bought some lubricant to use daily.  Tried to look for glycerin free.  Bought Vagisil as well, but thinks it gave her a smell like an infection was starting.  Has noted some improvement with daily use post bathing.  Patient doesn't feel like tissues have changed incredibly since last visit.  She reports having not had time with use of lubricants  regularly. She has not been using Estrace cream issued by MD.  Too messy and too expensive to continue.  Would like to continue with current treatment and check again on inside of vagina to assess improvements.  Has been going to gym regularly and feels much better overall. Participating in aerobics in the water.  Feels it is helping her mobilize LB and buttock region.   -       Subjective Pain    Able to rate subjective pain?  yes  -SW    Pre-Treatment Pain Level  2  -SW    Post-Treatment Pain Level  2  -SW       Posture/Observations    Posture- WNL  Posture is WNL  -SW    Posture/Observations Comments  happy demeanor. No distress in appearance noted.  Gait unremarkable. Patient brought in lubricant for assessment. KY without paraben, no glycerin and water based.  Approved for daily application to hydrate tissues post bath.  Vagisil also bought by patient.  Instructed to reduce use as it has glycerin present which may drive infections in vaginal cuff.    -       Lumbosacral Palpation    Sacrotubrous Ligament  Right:;Tender  -SW    Pelvic Floor  -- pt declined palpation this date.  -    Lumbosacral Palpation Comments  Alignment normal this date.  Tissues surrounding pelvic girdle are non-tender.  Internal palpation declined by patient.    -       General ROM    GENERAL ROM COMMENTS  functional   -SW       MMT (Manual Muscle Testing)    General MMT Comments  functional   -      User Key  (r) = Recorded By, (t) = Taken By, (c) = Cosigned By    Initials Name Provider Type    Aixa Vickers, PT Physical Therapist                     PT Assessment/Plan     Row Name 09/11/19 1500          PT Assessment    Assessment Comments  Patient continues to demonstrate improved management of s/s.  She is stating improved back pain with inc'd mobility noted.  She presents with lubricants that she has been applying post bath for hydration to vaginal cuff. Responding well.  Desires to continue with treatment a little  while longer prior to reassessment.  Feels good with progress.  Bowels have been improved.  Using spearmint oil for relaxation massage to belly which she believes to have helped as well.  Progressing toward independent management.   -     Rehab Potential  Good  -     Patient/caregiver participated in establishment of treatment plan and goals  Yes  -SW     Patient would benefit from skilled therapy intervention  Yes  -SW        PT Plan    PT Frequency  -- FU in 3 weeks  -     Predicted Duration of Therapy Intervention (Therapy Eval)  8-12 visits  -     PT Plan Comments  continue with independent management. Consult in 3 weeks for full work up of pelvic floor and LB. Transiton to HEp over next 1-2 visits anticipated.   -       User Key  (r) = Recorded By, (t) = Taken By, (c) = Cosigned By    Initials Name Provider Type    Aixa Vickers, PT Physical Therapist            OP Exercises     Row Name 09/11/19 1500             Subjective Comments    Subjective Comments  Worker's comp claim has been settled and getting settlement.  This relieves some long over-due stress.  After last visit, patient reports no pain but bled x 3 days brownish discharge.  Bought some lubricant to use daily.  Tried to look for glycerin free.  Bought Vagisil as well, but thinks it gave her a smell like an infection was starting.  Has noted some improvement with daily use post bathing.  Patient doesn't feel like tissues have changed incredibly since last visit.  She reports having not had time with use of lubricants regularly. She has not been using Estrace cream issued by MD.  Too messy and too expensive to continue.  Would like to continue with current treatment and check again on inside of vagina to assess improvements.  Has been going to gym regularly and feels much better overall. Participating in aerobics in the water.  Feels it is helping her mobilize LB and buttock region.   -         Subjective Pain    Able to rate  subjective pain?  yes  -      Pre-Treatment Pain Level  2  -      Post-Treatment Pain Level  2  -         Exercise 1    Exercise Name 1  alignment check  -         Exercise 2    Exercise Name 2  HEP instruction/review  -      Additional Comments  patient verbalized understanding of performance  -         Exercise 3    Exercise Name 3  Vagisil and KY lubricant education  -      Additional Comments  Patient encouraged to avoid items that include glycerin as to dec infections to vaginal cuff.  KY jelly approved as this one was glyercin free, paraben free and water soluble.   -        User Key  (r) = Recorded By, (t) = Taken By, (c) = Cosigned By    Initials Name Provider Type    SW Aixa Villegas, PT Physical Therapist                      PT OP Goals     Row Name 09/11/19 1727 09/11/19 1500       PT Short Term Goals    STG 1  --  patient to demonstrate abd release and comply daily with application.  -    STG 1 Progress  --  Met  -    STG 2  --  patient to show increase PF activation without overflow of TA with PF recruitment.   -    STG 2 Progress  --  Met  -    STG 3  --  patient to perform diaphragmatic breathing with correct coordination of PF excursion to appropriately support relaxation and normalized PF functions.  -    STG 3 Progress  --  Met  -    STG 4  --  patient to perform toileting positions to promote improved defecation and urination without pain and improve emptying that may improve LAMIN.  -[facility] 4 Progress  --  Met  -SW    STG 5  --  patient to show normal resting tone to PF in supine position consistently x 3 minutes without erratic behavior noted.   -    STG 5 Progress  --  Met  -       Long Term Goals    LTG Date to Achieve  --  11/27/19  -    LTG 1  --  subjectively improve pelvic pain by 70%   -    LTG 1 Progress  --  Met  -    LTG 2  --  patient to demo no abdominal triggers and improved fascial glide without pain   -    LTG 2 Progress  --  Met   -    LTG 3  --  patient to demonstrate normal coordination of SEMG in supine/seated postures such that contractions are normalized, resting tone rapidly achieved pre/post contraction, and with good amplitudes of movement noted.   -    LTG 3 Progress  --  Progressing  -    LTG 4  --  patient to report pain as mild vs mod/severe and intermittent in nature vs constant.   -    LTG 4 Progress  --  Progressing  -       Time Calculation    PT Goal Re-Cert Due Date  -- NEXT  -SW 09/26/19  -      User Key  (r) = Recorded By, (t) = Taken By, (c) = Cosigned By    Initials Name Provider Type    Aixa Vickers, PT Physical Therapist           Therapy Education  Given: HEP, Symptoms/condition management  Program: New, Reinforced  How Provided: Verbal  Provided to: Patient  Level of Understanding: Teach back education performed, Verbalized, Demonstrated              Time Calculation:   Start Time: 1516  Stop Time: 1600  Time Calculation (min): 44 min  Therapy Charges for Today     Code Description Service Date Service Provider Modifiers Qty    42895388100  PT SELF CARE/MGMT/TRAIN EA 15 MIN 9/11/2019 Aixa Villegas, PT GP 2    [id number]  PT THER PROC EA 15 MIN 9/11/2019 Aixa Villegas, PT GP 1                    Aixa Villegas PT  9/11/2019

## 2019-09-30 ENCOUNTER — HOSPITAL ENCOUNTER (OUTPATIENT)
Dept: PHYSICAL THERAPY | Facility: HOSPITAL | Age: 49
Setting detail: THERAPIES SERIES
Discharge: HOME | End: 2019-09-30

## 2019-09-30 DIAGNOSIS — R10.2 PELVIC PAIN IN FEMALE: Primary | ICD-10-CM

## 2019-09-30 DIAGNOSIS — N94.2 VAGINISMUS: ICD-10-CM

## 2019-09-30 PROCEDURE — 97535 SELF CARE MNGMENT TRAINING: CPT | Performed by: PHYSICAL THERAPIST
